# Patient Record
Sex: MALE | Race: WHITE | NOT HISPANIC OR LATINO | Employment: UNEMPLOYED | URBAN - METROPOLITAN AREA
[De-identification: names, ages, dates, MRNs, and addresses within clinical notes are randomized per-mention and may not be internally consistent; named-entity substitution may affect disease eponyms.]

---

## 2021-04-01 ENCOUNTER — RECORDS - HEALTHEAST (OUTPATIENT)
Dept: LAB | Facility: CLINIC | Age: 61
End: 2021-04-01

## 2021-04-01 LAB
SARS-COV-2 PCR COMMENT: NORMAL
SARS-COV-2 RNA SPEC QL NAA+PROBE: NEGATIVE
SARS-COV-2 VIRUS SPECIMEN SOURCE: NORMAL

## 2023-07-27 ENCOUNTER — TRANSFERRED RECORDS (OUTPATIENT)
Dept: HEALTH INFORMATION MANAGEMENT | Facility: CLINIC | Age: 63
End: 2023-07-27

## 2023-07-27 ENCOUNTER — APPOINTMENT (OUTPATIENT)
Dept: GENERAL RADIOLOGY | Facility: CLINIC | Age: 63
DRG: 481 | End: 2023-07-27
Payer: COMMERCIAL

## 2023-07-27 ENCOUNTER — HOSPITAL ENCOUNTER (INPATIENT)
Facility: CLINIC | Age: 63
LOS: 4 days | Discharge: NURSING, REHAB OR LONG-TERM CARE | DRG: 481 | End: 2023-07-31
Admitting: HOSPITALIST
Payer: COMMERCIAL

## 2023-07-27 ENCOUNTER — APPOINTMENT (OUTPATIENT)
Dept: GENERAL RADIOLOGY | Facility: CLINIC | Age: 63
DRG: 481 | End: 2023-07-27
Attending: EMERGENCY MEDICINE
Payer: COMMERCIAL

## 2023-07-27 DIAGNOSIS — S72.001A CLOSED DISPLACED FRACTURE OF RIGHT FEMORAL NECK (H): ICD-10-CM

## 2023-07-27 LAB
ANION GAP SERPL CALCULATED.3IONS-SCNC: 13 MMOL/L (ref 7–15)
ATRIAL RATE - MUSE: 75 BPM
BASOPHILS # BLD AUTO: 0.1 10E3/UL (ref 0–0.2)
BASOPHILS NFR BLD AUTO: 1 %
BUN SERPL-MCNC: 20 MG/DL (ref 8–23)
CALCIUM SERPL-MCNC: 9.4 MG/DL (ref 8.8–10.2)
CHLORIDE SERPL-SCNC: 102 MMOL/L (ref 98–107)
CREAT SERPL-MCNC: 1.42 MG/DL (ref 0.67–1.17)
DEPRECATED HCO3 PLAS-SCNC: 23 MMOL/L (ref 22–29)
DIASTOLIC BLOOD PRESSURE - MUSE: NORMAL MMHG
EOSINOPHIL # BLD AUTO: 0.2 10E3/UL (ref 0–0.7)
EOSINOPHIL NFR BLD AUTO: 3 %
ERYTHROCYTE [DISTWIDTH] IN BLOOD BY AUTOMATED COUNT: 13.1 % (ref 10–15)
GFR SERPL CREATININE-BSD FRML MDRD: 57 ML/MIN/1.73M2
GLUCOSE SERPL-MCNC: 128 MG/DL (ref 70–99)
HCT VFR BLD AUTO: 40 % (ref 40–53)
HGB BLD-MCNC: 13.8 G/DL (ref 13.3–17.7)
IMM GRANULOCYTES # BLD: 0 10E3/UL
IMM GRANULOCYTES NFR BLD: 0 %
INR PPP: 0.98 (ref 0.85–1.15)
INTERPRETATION ECG - MUSE: NORMAL
LYMPHOCYTES # BLD AUTO: 1.3 10E3/UL (ref 0.8–5.3)
LYMPHOCYTES NFR BLD AUTO: 18 %
MCH RBC QN AUTO: 31.9 PG (ref 26.5–33)
MCHC RBC AUTO-ENTMCNC: 34.5 G/DL (ref 31.5–36.5)
MCV RBC AUTO: 93 FL (ref 78–100)
MONOCYTES # BLD AUTO: 0.8 10E3/UL (ref 0–1.3)
MONOCYTES NFR BLD AUTO: 12 %
NEUTROPHILS # BLD AUTO: 4.7 10E3/UL (ref 1.6–8.3)
NEUTROPHILS NFR BLD AUTO: 66 %
NRBC # BLD AUTO: 0 10E3/UL
NRBC BLD AUTO-RTO: 0 /100
NT-PROBNP SERPL-MCNC: 460 PG/ML (ref 0–900)
P AXIS - MUSE: 62 DEGREES
PLATELET # BLD AUTO: 198 10E3/UL (ref 150–450)
POTASSIUM SERPL-SCNC: 3.9 MMOL/L (ref 3.4–5.3)
PR INTERVAL - MUSE: 154 MS
QRS DURATION - MUSE: 92 MS
QT - MUSE: 398 MS
QTC - MUSE: 444 MS
R AXIS - MUSE: 43 DEGREES
RBC # BLD AUTO: 4.32 10E6/UL (ref 4.4–5.9)
SODIUM SERPL-SCNC: 138 MMOL/L (ref 136–145)
SYSTOLIC BLOOD PRESSURE - MUSE: NORMAL MMHG
T AXIS - MUSE: 76 DEGREES
VENTRICULAR RATE- MUSE: 75 BPM
WBC # BLD AUTO: 7.1 10E3/UL (ref 4–11)

## 2023-07-27 PROCEDURE — 250N000013 HC RX MED GY IP 250 OP 250 PS 637

## 2023-07-27 PROCEDURE — 99223 1ST HOSP IP/OBS HIGH 75: CPT | Performed by: HOSPITALIST

## 2023-07-27 PROCEDURE — 73562 X-RAY EXAM OF KNEE 3: CPT | Mod: RT

## 2023-07-27 PROCEDURE — 85610 PROTHROMBIN TIME: CPT | Performed by: EMERGENCY MEDICINE

## 2023-07-27 PROCEDURE — 99285 EMERGENCY DEPT VISIT HI MDM: CPT | Mod: 25

## 2023-07-27 PROCEDURE — 120N000001 HC R&B MED SURG/OB

## 2023-07-27 PROCEDURE — 73502 X-RAY EXAM HIP UNI 2-3 VIEWS: CPT

## 2023-07-27 PROCEDURE — 71045 X-RAY EXAM CHEST 1 VIEW: CPT

## 2023-07-27 PROCEDURE — 36415 COLL VENOUS BLD VENIPUNCTURE: CPT | Performed by: EMERGENCY MEDICINE

## 2023-07-27 PROCEDURE — 83880 ASSAY OF NATRIURETIC PEPTIDE: CPT | Performed by: HOSPITALIST

## 2023-07-27 PROCEDURE — 85025 COMPLETE CBC W/AUTO DIFF WBC: CPT | Performed by: EMERGENCY MEDICINE

## 2023-07-27 PROCEDURE — 80048 BASIC METABOLIC PNL TOTAL CA: CPT | Performed by: EMERGENCY MEDICINE

## 2023-07-27 PROCEDURE — 93005 ELECTROCARDIOGRAM TRACING: CPT

## 2023-07-27 RX ORDER — FUROSEMIDE 20 MG
20 TABLET ORAL DAILY
COMMUNITY

## 2023-07-27 RX ORDER — ACETAMINOPHEN 325 MG/1
650 TABLET ORAL 2 TIMES DAILY
Status: ON HOLD | COMMUNITY
End: 2023-07-30

## 2023-07-27 RX ORDER — LANOLIN ALCOHOL/MO/W.PET/CERES
100 CREAM (GRAM) TOPICAL DAILY
COMMUNITY

## 2023-07-27 RX ORDER — OXYCODONE HYDROCHLORIDE 5 MG/1
5 TABLET ORAL ONCE
Status: COMPLETED | OUTPATIENT
Start: 2023-07-27 | End: 2023-07-27

## 2023-07-27 RX ORDER — METOPROLOL SUCCINATE 50 MG/1
50 TABLET, EXTENDED RELEASE ORAL DAILY
COMMUNITY

## 2023-07-27 RX ORDER — SERTRALINE HYDROCHLORIDE 100 MG/1
100 TABLET, FILM COATED ORAL DAILY
COMMUNITY

## 2023-07-27 RX ORDER — AMLODIPINE BESYLATE 5 MG/1
5 TABLET ORAL DAILY
COMMUNITY

## 2023-07-27 RX ORDER — TRAZODONE HYDROCHLORIDE 50 MG/1
50 TABLET, FILM COATED ORAL AT BEDTIME
COMMUNITY

## 2023-07-27 RX ORDER — ASPIRIN 81 MG/1
81 TABLET, CHEWABLE ORAL DAILY
Status: ON HOLD | COMMUNITY
End: 2023-07-30

## 2023-07-27 RX ORDER — POTASSIUM CHLORIDE 1500 MG/1
40 TABLET, EXTENDED RELEASE ORAL DAILY
COMMUNITY

## 2023-07-27 RX ORDER — ATORVASTATIN CALCIUM 40 MG/1
40 TABLET, FILM COATED ORAL AT BEDTIME
COMMUNITY

## 2023-07-27 RX ADMIN — OXYCODONE HYDROCHLORIDE 5 MG: 5 TABLET ORAL at 20:37

## 2023-07-27 ASSESSMENT — ACTIVITIES OF DAILY LIVING (ADL)
ADLS_ACUITY_SCORE: 35

## 2023-07-27 NOTE — ED PROVIDER NOTES
"  History   Chief Complaint:  Fall    The history is provided by the patient and the nursing home.      Joseph Miller is a 62 year old male with a history of past ETOH abuse, dementia, CVA, CAD and Cardiomyopathy s/p CABG who presents with right hip and right knee pain. Patient reports that he fell yesterday in his room and landed on his right hip and knee. Denies head trauma or loss of consciousness. No neck pain or back pain. He expresses that the pain in his hip is worse than his knee pain. Denies chest, back, and abdominal pain. Denies dizziness and shortness of breath. He notes pain exacerbation with movement. Denies history of surgery on his hip.     Independent Historian:   The supervisor at the patients group home, Gilbert, reports that the patient had an unwitnessed fall the night of 7/25. Patient wad limping yesterday but Gilbert noticed that he could hardly get up and was not walking well, prompting him calling EMS.    Review of External Notes:   [facility] clinical summary as of today    Medications:    Amlodipine  Lipitor  Lasix  Metoprolol  Potassium chloride  Thiamine mononitrate  Trazodone  Aspirin  Omeprazole  Zoloft    Past Medical History:    Functional urinary incontinence   Dementia   Acute cerebrovascular accident   NSTEMI   Cardiomyopathy   Tobacco dependence   Alcohol abuse   Heart failure     Surgical History:  CABG    Physical Exam     Patient Vitals for the past 24 hrs:   BP Temp Temp src Pulse Resp SpO2 Height Weight   07/27/23 1800 -- -- -- -- -- 94 % -- --   07/27/23 1717 124/78 97.7  F (36.5  C) Oral 83 18 94 % 1.765 m (5' 9.5\") 84.4 kg (186 lb)     Physical Exam  General: Nontoxic appearing adult male laying on [facility].  HENT:   Ears: No hemotympanum.  Head: No raccoon eyes or batista signs.  Mouth/Throat: Oropharynx clear and moist.  Eyes: Conjunctive and EOM normal. PERRLA.  Neck: Normal ROM. No rigidity.  No midline C-spine tenderness.  CV: Regular rate and rhythm. Normal S1, " S2.   Resp: Lungs clear to auscultation bilaterally. Normal respiratory effort. No cough observed.  GI: Abdomen soft, non distended and nontender.   MSK: Patient has tenderness over the right hip.  Able to straight leg raise on the right, but much less so as compared to the left.  Pain elicited with any movement.  Unable to fully weight-bear.  Patient has mild tenderness over the right knee, but is able to flex and extend the right knee without difficulty.  Skin: Warm and dry.  No lacerations or ecchymosis.  Neuro: Awake, alert, oriented x 3.  Sensation intact right lower extremity.  Psych: Normal mood and affect.      Emergency Department Course   ECG  ECG taken at 2031, ECG read at 2039  Normal sinus rhythm  Normal ECG   Rate 75 bpm. LA interval 154 ms. QRS duration 92 ms. QT/QTc 394/444 ms. P-R-T axes 62 43 76.     Imaging:  XR Chest 1 View   Final Result   IMPRESSION: Normal size of cardiomediastinal silhouette status post median sternotomy and CABG. No focal airspace consolidation, pleural effusion or pneumothorax. No acute bony abnormality.      XR Pelvis w Hip Right 1 View   Final Result   IMPRESSION: There is an acute mildly displaced and impacted right femoral neck fracture in valgus alignment. Mild degenerative changes of the right hip.       NOTE: ABNORMAL REPORT      THE DICTATION ABOVE DESCRIBES AN ABNORMALITY FOR WHICH FOLLOW-UP IS NEEDED.       XR Knee Right 3 Views   Final Result   IMPRESSION: No acute fracture or malalignment. No significant degenerative changes or knee joint effusion. Osteopenia.         Report per radiology    Emergency Department Course & Assessments:    Interventions:  Medications   oxyCODONE (ROXICODONE) tablet 5 mg (5 mg Oral $Given 7/27/23 2037)     Assessments:  1705 I called the patient's nursing home supervisor, Gilbert, to gain information on patient's fall. Phone [phone]. 1708 I obtained history and examined the patient as noted above.   2007 I rechecked the patient.  I discussed findings and admission with the patient. All questions answered.    2011 I left a message for Gilbert, nursing home supervisor, to inform him on the patient's hip fracture and his admission to the hospital.    Independent Interpretation (X-rays, CTs, rhythm strip):  Right hip fracture noted on XR    Consultations/Discussion of Management or Tests:  ED Course as of 07/27/23 2050 Thu Jul 27, 2023 2006 Patient staffed with Dr. Mcdonnell, emergency department attending. Hospitalist paged.    2031 I consulted with Dr. Vicente, hospitalist, regarding the patient's history and presentation here in the emergency department who accepted the patient for admission.     Social Determinants of Health affecting care:   Dementia and resides in group home.    Disposition:  The patient was admitted to the hospital under the care of Dr. Vicente.     Impression & Plan      Medical Decision Making:  Joseph Miller is a 62 year old male with a history of past ETOH abuse, dementia, CVA, CAD and Cardiomyopathy s/p CABG who presented with right hip and right knee pain after a fall at his group home yesterday.  He was initially able to walk, but it has gotten progressively harder to ambulate and bear weight.  X-ray of the right hip obtained showing an acute mildly displaced and impacted right femoral neck fracture in valgus alignment.  X-ray of the right knee negative for any acute fracture.  I discussed his case with Dr. Vicente of the hospitalist service who graciously excepted him for admission.  He will need Ortho consult.  Basic labs and EKG ordered and pending since patient will need this preoperatively.  Patient denies hitting his head, hurting his neck or back during this incident.  Due to need for hospital admission, I staffed the patient with Dr. Mcdonnell.  Please see separate [facility] supervisory note.    Diagnosis:    ICD-10-CM    1. Closed displaced fracture of right femoral neck (H)  S72.001A         Scribe Disclosure:  Anais PACHECO  Juan & Danielle Rice, am serving as a scribe at 5:29 PM on 7/27/2023 to document services personally performed by Gardenia Salinas PA-C based on my observations and the provider's statements to me.     7/27/2023   Gardenia Salinas PA-C Dewing, Jennifer C, PA-C  07/28/23 0002

## 2023-07-27 NOTE — ED NOTES
Bed: FT04  Expected date:   Expected time:   Means of arrival:   Comments:  [id number]  from group home  62 M right hip pain after fall yesterday eta [id number]

## 2023-07-27 NOTE — ED TRIAGE NOTES
Patient stated that he fell yesterday, and is now having right hip pain and knee pain, he stated that he is unable to walk due to the pain.      Triage Assessment       Row Name 07/27/23 7402       Triage Assessment (Adult)    Airway WDL WDL       Respiratory WDL    Respiratory WDL WDL       Skin Circulation/Temperature WDL    Skin Circulation/Temperature WDL WDL       Cardiac WDL    Cardiac WDL WDL       Cognitive/Neuro/Behavioral WDL    Cognitive/Neuro/Behavioral WDL WDL;X

## 2023-07-28 ENCOUNTER — APPOINTMENT (OUTPATIENT)
Dept: GENERAL RADIOLOGY | Facility: CLINIC | Age: 63
DRG: 481 | End: 2023-07-28
Attending: ORTHOPAEDIC SURGERY
Payer: COMMERCIAL

## 2023-07-28 ENCOUNTER — ANESTHESIA (OUTPATIENT)
Dept: SURGERY | Facility: CLINIC | Age: 63
DRG: 481 | End: 2023-07-28
Payer: COMMERCIAL

## 2023-07-28 ENCOUNTER — ANESTHESIA EVENT (OUTPATIENT)
Dept: SURGERY | Facility: CLINIC | Age: 63
DRG: 481 | End: 2023-07-28
Payer: COMMERCIAL

## 2023-07-28 ENCOUNTER — APPOINTMENT (OUTPATIENT)
Dept: CARDIOLOGY | Facility: CLINIC | Age: 63
DRG: 481 | End: 2023-07-28
Attending: INTERNAL MEDICINE
Payer: COMMERCIAL

## 2023-07-28 LAB
ANION GAP SERPL CALCULATED.3IONS-SCNC: 12 MMOL/L (ref 7–15)
BUN SERPL-MCNC: 19.6 MG/DL (ref 8–23)
CALCIUM SERPL-MCNC: 9.6 MG/DL (ref 8.8–10.2)
CHLORIDE SERPL-SCNC: 103 MMOL/L (ref 98–107)
CREAT SERPL-MCNC: 1.41 MG/DL (ref 0.67–1.17)
DEPRECATED HCO3 PLAS-SCNC: 22 MMOL/L (ref 22–29)
ERYTHROCYTE [DISTWIDTH] IN BLOOD BY AUTOMATED COUNT: 13.2 % (ref 10–15)
GFR SERPL CREATININE-BSD FRML MDRD: 57 ML/MIN/1.73M2
GLUCOSE SERPL-MCNC: 117 MG/DL (ref 70–99)
HCT VFR BLD AUTO: 39.7 % (ref 40–53)
HGB BLD-MCNC: 13.9 G/DL (ref 13.3–17.7)
LVEF ECHO: NORMAL
MAGNESIUM SERPL-MCNC: 2.3 MG/DL (ref 1.7–2.3)
MCH RBC QN AUTO: 32.6 PG (ref 26.5–33)
MCHC RBC AUTO-ENTMCNC: 35 G/DL (ref 31.5–36.5)
MCV RBC AUTO: 93 FL (ref 78–100)
PHOSPHATE SERPL-MCNC: 3.5 MG/DL (ref 2.5–4.5)
PLATELET # BLD AUTO: 192 10E3/UL (ref 150–450)
POTASSIUM SERPL-SCNC: 4 MMOL/L (ref 3.4–5.3)
RBC # BLD AUTO: 4.27 10E6/UL (ref 4.4–5.9)
SODIUM SERPL-SCNC: 137 MMOL/L (ref 136–145)
WBC # BLD AUTO: 6.3 10E3/UL (ref 4–11)

## 2023-07-28 PROCEDURE — 0QS634Z REPOSITION RIGHT UPPER FEMUR WITH INTERNAL FIXATION DEVICE, PERCUTANEOUS APPROACH: ICD-10-PCS | Performed by: ORTHOPAEDIC SURGERY

## 2023-07-28 PROCEDURE — 120N000001 HC R&B MED SURG/OB

## 2023-07-28 PROCEDURE — 250N000025 HC SEVOFLURANE, PER MIN: Performed by: ORTHOPAEDIC SURGERY

## 2023-07-28 PROCEDURE — 370N000017 HC ANESTHESIA TECHNICAL FEE, PER MIN: Performed by: ORTHOPAEDIC SURGERY

## 2023-07-28 PROCEDURE — 999N000208 ECHOCARDIOGRAM COMPLETE

## 2023-07-28 PROCEDURE — 250N000009 HC RX 250: Performed by: ORTHOPAEDIC SURGERY

## 2023-07-28 PROCEDURE — 84100 ASSAY OF PHOSPHORUS: CPT | Performed by: HOSPITALIST

## 2023-07-28 PROCEDURE — 83735 ASSAY OF MAGNESIUM: CPT | Performed by: HOSPITALIST

## 2023-07-28 PROCEDURE — 272N000001 HC OR GENERAL SUPPLY STERILE: Performed by: ORTHOPAEDIC SURGERY

## 2023-07-28 PROCEDURE — 250N000013 HC RX MED GY IP 250 OP 250 PS 637: Performed by: INTERNAL MEDICINE

## 2023-07-28 PROCEDURE — 250N000009 HC RX 250: Performed by: STUDENT IN AN ORGANIZED HEALTH CARE EDUCATION/TRAINING PROGRAM

## 2023-07-28 PROCEDURE — 258N000003 HC RX IP 258 OP 636: Performed by: ANESTHESIOLOGY

## 2023-07-28 PROCEDURE — 258N000003 HC RX IP 258 OP 636: Performed by: NURSE ANESTHETIST, CERTIFIED REGISTERED

## 2023-07-28 PROCEDURE — 36415 COLL VENOUS BLD VENIPUNCTURE: CPT | Performed by: INTERNAL MEDICINE

## 2023-07-28 PROCEDURE — 82310 ASSAY OF CALCIUM: CPT | Performed by: INTERNAL MEDICINE

## 2023-07-28 PROCEDURE — 85027 COMPLETE CBC AUTOMATED: CPT | Performed by: HOSPITALIST

## 2023-07-28 PROCEDURE — 999N000141 HC STATISTIC PRE-PROCEDURE NURSING ASSESSMENT: Performed by: ORTHOPAEDIC SURGERY

## 2023-07-28 PROCEDURE — 710N000009 HC RECOVERY PHASE 1, LEVEL 1, PER MIN: Performed by: ORTHOPAEDIC SURGERY

## 2023-07-28 PROCEDURE — 360N000084 HC SURGERY LEVEL 4 W/ FLUORO, PER MIN: Performed by: ORTHOPAEDIC SURGERY

## 2023-07-28 PROCEDURE — 250N000009 HC RX 250: Performed by: NURSE ANESTHETIST, CERTIFIED REGISTERED

## 2023-07-28 PROCEDURE — 250N000013 HC RX MED GY IP 250 OP 250 PS 637: Performed by: HOSPITALIST

## 2023-07-28 PROCEDURE — 999N000179 XR SURGERY CARM FLUORO LESS THAN 5 MIN W STILLS

## 2023-07-28 PROCEDURE — C1713 ANCHOR/SCREW BN/BN,TIS/BN: HCPCS | Performed by: ORTHOPAEDIC SURGERY

## 2023-07-28 PROCEDURE — 250N000013 HC RX MED GY IP 250 OP 250 PS 637: Performed by: PHYSICIAN ASSISTANT

## 2023-07-28 PROCEDURE — 99233 SBSQ HOSP IP/OBS HIGH 50: CPT | Performed by: INTERNAL MEDICINE

## 2023-07-28 PROCEDURE — 93306 TTE W/DOPPLER COMPLETE: CPT | Mod: 26 | Performed by: INTERNAL MEDICINE

## 2023-07-28 PROCEDURE — 250N000011 HC RX IP 250 OP 636: Mod: JZ | Performed by: NURSE ANESTHETIST, CERTIFIED REGISTERED

## 2023-07-28 PROCEDURE — 250N000011 HC RX IP 250 OP 636: Performed by: STUDENT IN AN ORGANIZED HEALTH CARE EDUCATION/TRAINING PROGRAM

## 2023-07-28 PROCEDURE — 250N000009 HC RX 250: Performed by: ANESTHESIOLOGY

## 2023-07-28 PROCEDURE — 250N000011 HC RX IP 250 OP 636: Mod: JZ | Performed by: PHYSICIAN ASSISTANT

## 2023-07-28 PROCEDURE — 250N000011 HC RX IP 250 OP 636: Performed by: ANESTHESIOLOGY

## 2023-07-28 DEVICE — IMP SCR SYN CAN 6.5X16 THRDX95MM SS 208.414: Type: IMPLANTABLE DEVICE | Site: HIP | Status: FUNCTIONAL

## 2023-07-28 DEVICE — IMP SCR SYN CAN 6.5X16 THRDX85MM SS 208.412: Type: IMPLANTABLE DEVICE | Site: HIP | Status: FUNCTIONAL

## 2023-07-28 DEVICE — IMP SCR SYN CAN 6.5X16 THRDX100MM SS 208.415: Type: IMPLANTABLE DEVICE | Site: HIP | Status: FUNCTIONAL

## 2023-07-28 RX ORDER — FENTANYL CITRATE 50 UG/ML
INJECTION, SOLUTION INTRAMUSCULAR; INTRAVENOUS PRN
Status: DISCONTINUED | OUTPATIENT
Start: 2023-07-28 | End: 2023-07-28

## 2023-07-28 RX ORDER — HYDROMORPHONE HCL IN WATER/PF 6 MG/30 ML
0.4 PATIENT CONTROLLED ANALGESIA SYRINGE INTRAVENOUS EVERY 5 MIN PRN
Status: DISCONTINUED | OUTPATIENT
Start: 2023-07-28 | End: 2023-07-28 | Stop reason: HOSPADM

## 2023-07-28 RX ORDER — ONDANSETRON 2 MG/ML
INJECTION INTRAMUSCULAR; INTRAVENOUS PRN
Status: DISCONTINUED | OUTPATIENT
Start: 2023-07-28 | End: 2023-07-28

## 2023-07-28 RX ORDER — AMOXICILLIN 250 MG
2 CAPSULE ORAL 2 TIMES DAILY
Status: DISCONTINUED | OUTPATIENT
Start: 2023-07-28 | End: 2023-07-31 | Stop reason: HOSPADM

## 2023-07-28 RX ORDER — DEXMEDETOMIDINE HYDROCHLORIDE 4 UG/ML
INJECTION, SOLUTION INTRAVENOUS PRN
Status: DISCONTINUED | OUTPATIENT
Start: 2023-07-28 | End: 2023-07-28

## 2023-07-28 RX ORDER — SODIUM CHLORIDE, SODIUM LACTATE, POTASSIUM CHLORIDE, CALCIUM CHLORIDE 600; 310; 30; 20 MG/100ML; MG/100ML; MG/100ML; MG/100ML
INJECTION, SOLUTION INTRAVENOUS CONTINUOUS
Status: DISCONTINUED | OUTPATIENT
Start: 2023-07-28 | End: 2023-07-28 | Stop reason: HOSPADM

## 2023-07-28 RX ORDER — ASPIRIN 325 MG
325 TABLET, DELAYED RELEASE (ENTERIC COATED) ORAL DAILY
Status: DISCONTINUED | OUTPATIENT
Start: 2023-07-28 | End: 2023-07-31 | Stop reason: HOSPADM

## 2023-07-28 RX ORDER — NALOXONE HYDROCHLORIDE 0.4 MG/ML
0.2 INJECTION, SOLUTION INTRAMUSCULAR; INTRAVENOUS; SUBCUTANEOUS
Status: DISCONTINUED | OUTPATIENT
Start: 2023-07-28 | End: 2023-07-31 | Stop reason: HOSPADM

## 2023-07-28 RX ORDER — DEXAMETHASONE SODIUM PHOSPHATE 4 MG/ML
INJECTION, SOLUTION INTRA-ARTICULAR; INTRALESIONAL; INTRAMUSCULAR; INTRAVENOUS; SOFT TISSUE PRN
Status: DISCONTINUED | OUTPATIENT
Start: 2023-07-28 | End: 2023-07-28

## 2023-07-28 RX ORDER — HYDROXYZINE HYDROCHLORIDE 25 MG/1
25 TABLET, FILM COATED ORAL EVERY 6 HOURS PRN
Status: DISCONTINUED | OUTPATIENT
Start: 2023-07-28 | End: 2023-07-31 | Stop reason: HOSPADM

## 2023-07-28 RX ORDER — ONDANSETRON 4 MG/1
4 TABLET, ORALLY DISINTEGRATING ORAL EVERY 6 HOURS PRN
Status: DISCONTINUED | OUTPATIENT
Start: 2023-07-28 | End: 2023-07-28

## 2023-07-28 RX ORDER — ONDANSETRON 2 MG/ML
4 INJECTION INTRAMUSCULAR; INTRAVENOUS EVERY 6 HOURS PRN
Status: DISCONTINUED | OUTPATIENT
Start: 2023-07-28 | End: 2023-07-31 | Stop reason: HOSPADM

## 2023-07-28 RX ORDER — FENTANYL CITRATE 0.05 MG/ML
50 INJECTION, SOLUTION INTRAMUSCULAR; INTRAVENOUS
Status: DISCONTINUED | OUTPATIENT
Start: 2023-07-28 | End: 2023-07-28 | Stop reason: HOSPADM

## 2023-07-28 RX ORDER — NALOXONE HYDROCHLORIDE 0.4 MG/ML
0.4 INJECTION, SOLUTION INTRAMUSCULAR; INTRAVENOUS; SUBCUTANEOUS
Status: DISCONTINUED | OUTPATIENT
Start: 2023-07-28 | End: 2023-07-31 | Stop reason: HOSPADM

## 2023-07-28 RX ORDER — OXYCODONE HYDROCHLORIDE 5 MG/1
5 TABLET ORAL EVERY 4 HOURS PRN
Status: DISCONTINUED | OUTPATIENT
Start: 2023-07-28 | End: 2023-07-31 | Stop reason: HOSPADM

## 2023-07-28 RX ORDER — METOPROLOL SUCCINATE 50 MG/1
50 TABLET, EXTENDED RELEASE ORAL DAILY
Status: DISCONTINUED | OUTPATIENT
Start: 2023-07-28 | End: 2023-07-31 | Stop reason: HOSPADM

## 2023-07-28 RX ORDER — ATORVASTATIN CALCIUM 40 MG/1
40 TABLET, FILM COATED ORAL DAILY
Status: DISCONTINUED | OUTPATIENT
Start: 2023-07-28 | End: 2023-07-31 | Stop reason: HOSPADM

## 2023-07-28 RX ORDER — TRAZODONE HYDROCHLORIDE 50 MG/1
50 TABLET, FILM COATED ORAL AT BEDTIME
Status: DISCONTINUED | OUTPATIENT
Start: 2023-07-28 | End: 2023-07-31 | Stop reason: HOSPADM

## 2023-07-28 RX ORDER — POLYETHYLENE GLYCOL 3350 17 G/17G
17 POWDER, FOR SOLUTION ORAL DAILY PRN
Status: DISCONTINUED | OUTPATIENT
Start: 2023-07-28 | End: 2023-07-31 | Stop reason: HOSPADM

## 2023-07-28 RX ORDER — PANTOPRAZOLE SODIUM 40 MG/1
40 TABLET, DELAYED RELEASE ORAL DAILY
Status: DISCONTINUED | OUTPATIENT
Start: 2023-07-28 | End: 2023-07-31 | Stop reason: HOSPADM

## 2023-07-28 RX ORDER — ONDANSETRON 2 MG/ML
4 INJECTION INTRAMUSCULAR; INTRAVENOUS EVERY 6 HOURS PRN
Status: DISCONTINUED | OUTPATIENT
Start: 2023-07-28 | End: 2023-07-28

## 2023-07-28 RX ORDER — POLYETHYLENE GLYCOL 3350 17 G/17G
17 POWDER, FOR SOLUTION ORAL DAILY
Status: DISCONTINUED | OUTPATIENT
Start: 2023-07-29 | End: 2023-07-31 | Stop reason: HOSPADM

## 2023-07-28 RX ORDER — AMOXICILLIN 250 MG
1 CAPSULE ORAL 2 TIMES DAILY
Status: DISCONTINUED | OUTPATIENT
Start: 2023-07-28 | End: 2023-07-28

## 2023-07-28 RX ORDER — ACETAMINOPHEN 325 MG/1
975 TABLET ORAL EVERY 8 HOURS
Status: DISCONTINUED | OUTPATIENT
Start: 2023-07-28 | End: 2023-07-31 | Stop reason: HOSPADM

## 2023-07-28 RX ORDER — MAGNESIUM HYDROXIDE 1200 MG/15ML
LIQUID ORAL PRN
Status: DISCONTINUED | OUTPATIENT
Start: 2023-07-28 | End: 2023-07-28 | Stop reason: HOSPADM

## 2023-07-28 RX ORDER — TRANEXAMIC ACID 10 MG/ML
1 INJECTION, SOLUTION INTRAVENOUS ONCE
Status: COMPLETED | OUTPATIENT
Start: 2023-07-28 | End: 2023-07-28

## 2023-07-28 RX ORDER — METHOCARBAMOL 500 MG/1
500 TABLET, FILM COATED ORAL 4 TIMES DAILY PRN
Status: DISCONTINUED | OUTPATIENT
Start: 2023-07-28 | End: 2023-07-31 | Stop reason: HOSPADM

## 2023-07-28 RX ORDER — FENTANYL CITRATE 50 UG/ML
50 INJECTION, SOLUTION INTRAMUSCULAR; INTRAVENOUS EVERY 5 MIN PRN
Status: DISCONTINUED | OUTPATIENT
Start: 2023-07-28 | End: 2023-07-28 | Stop reason: HOSPADM

## 2023-07-28 RX ORDER — BISACODYL 10 MG
10 SUPPOSITORY, RECTAL RECTAL DAILY PRN
Status: DISCONTINUED | OUTPATIENT
Start: 2023-07-28 | End: 2023-07-31 | Stop reason: HOSPADM

## 2023-07-28 RX ORDER — CEFAZOLIN SODIUM/WATER 2 G/20 ML
2 SYRINGE (ML) INTRAVENOUS SEE ADMIN INSTRUCTIONS
Status: DISCONTINUED | OUTPATIENT
Start: 2023-07-28 | End: 2023-07-28 | Stop reason: HOSPADM

## 2023-07-28 RX ORDER — LIDOCAINE HYDROCHLORIDE 20 MG/ML
INJECTION, SOLUTION INFILTRATION; PERINEURAL PRN
Status: DISCONTINUED | OUTPATIENT
Start: 2023-07-28 | End: 2023-07-28

## 2023-07-28 RX ORDER — CEFAZOLIN SODIUM/WATER 2 G/20 ML
2 SYRINGE (ML) INTRAVENOUS
Status: COMPLETED | OUTPATIENT
Start: 2023-07-28 | End: 2023-07-28

## 2023-07-28 RX ORDER — LIDOCAINE 40 MG/G
CREAM TOPICAL
Status: DISCONTINUED | OUTPATIENT
Start: 2023-07-28 | End: 2023-07-28 | Stop reason: HOSPADM

## 2023-07-28 RX ORDER — HYDROMORPHONE HCL IN WATER/PF 6 MG/30 ML
0.2 PATIENT CONTROLLED ANALGESIA SYRINGE INTRAVENOUS EVERY 5 MIN PRN
Status: DISCONTINUED | OUTPATIENT
Start: 2023-07-28 | End: 2023-07-28 | Stop reason: HOSPADM

## 2023-07-28 RX ORDER — CEFAZOLIN SODIUM 2 G/100ML
2 INJECTION, SOLUTION INTRAVENOUS EVERY 8 HOURS
Status: COMPLETED | OUTPATIENT
Start: 2023-07-28 | End: 2023-07-29

## 2023-07-28 RX ORDER — PROCHLORPERAZINE 25 MG
25 SUPPOSITORY, RECTAL RECTAL EVERY 12 HOURS PRN
Status: DISCONTINUED | OUTPATIENT
Start: 2023-07-28 | End: 2023-07-28

## 2023-07-28 RX ORDER — LIDOCAINE 40 MG/G
CREAM TOPICAL
Status: DISCONTINUED | OUTPATIENT
Start: 2023-07-28 | End: 2023-07-31 | Stop reason: HOSPADM

## 2023-07-28 RX ORDER — PROPOFOL 10 MG/ML
INJECTION, EMULSION INTRAVENOUS PRN
Status: DISCONTINUED | OUTPATIENT
Start: 2023-07-28 | End: 2023-07-28

## 2023-07-28 RX ORDER — HYDROMORPHONE HCL IN WATER/PF 6 MG/30 ML
0.2 PATIENT CONTROLLED ANALGESIA SYRINGE INTRAVENOUS
Status: DISCONTINUED | OUTPATIENT
Start: 2023-07-28 | End: 2023-07-31 | Stop reason: HOSPADM

## 2023-07-28 RX ORDER — ONDANSETRON 4 MG/1
4 TABLET, ORALLY DISINTEGRATING ORAL EVERY 6 HOURS PRN
Status: DISCONTINUED | OUTPATIENT
Start: 2023-07-28 | End: 2023-07-31 | Stop reason: HOSPADM

## 2023-07-28 RX ORDER — HYDROMORPHONE HCL IN WATER/PF 6 MG/30 ML
0.4 PATIENT CONTROLLED ANALGESIA SYRINGE INTRAVENOUS
Status: DISCONTINUED | OUTPATIENT
Start: 2023-07-28 | End: 2023-07-31 | Stop reason: HOSPADM

## 2023-07-28 RX ORDER — SERTRALINE HYDROCHLORIDE 100 MG/1
100 TABLET, FILM COATED ORAL DAILY
Status: DISCONTINUED | OUTPATIENT
Start: 2023-07-28 | End: 2023-07-31 | Stop reason: HOSPADM

## 2023-07-28 RX ORDER — SODIUM CHLORIDE, SODIUM LACTATE, POTASSIUM CHLORIDE, CALCIUM CHLORIDE 600; 310; 30; 20 MG/100ML; MG/100ML; MG/100ML; MG/100ML
INJECTION, SOLUTION INTRAVENOUS CONTINUOUS
Status: DISCONTINUED | OUTPATIENT
Start: 2023-07-28 | End: 2023-07-30

## 2023-07-28 RX ORDER — AMOXICILLIN 250 MG
1 CAPSULE ORAL 2 TIMES DAILY
Status: DISCONTINUED | OUTPATIENT
Start: 2023-07-28 | End: 2023-07-31 | Stop reason: HOSPADM

## 2023-07-28 RX ORDER — AMLODIPINE BESYLATE 5 MG/1
5 TABLET ORAL DAILY
Status: DISCONTINUED | OUTPATIENT
Start: 2023-07-28 | End: 2023-07-31 | Stop reason: HOSPADM

## 2023-07-28 RX ORDER — PROCHLORPERAZINE MALEATE 10 MG
10 TABLET ORAL EVERY 6 HOURS PRN
Status: DISCONTINUED | OUTPATIENT
Start: 2023-07-28 | End: 2023-07-31 | Stop reason: HOSPADM

## 2023-07-28 RX ORDER — ONDANSETRON 2 MG/ML
4 INJECTION INTRAMUSCULAR; INTRAVENOUS EVERY 30 MIN PRN
Status: DISCONTINUED | OUTPATIENT
Start: 2023-07-28 | End: 2023-07-28 | Stop reason: HOSPADM

## 2023-07-28 RX ORDER — NICOTINE 21 MG/24HR
1 PATCH, TRANSDERMAL 24 HOURS TRANSDERMAL DAILY
Status: DISCONTINUED | OUTPATIENT
Start: 2023-07-28 | End: 2023-07-31 | Stop reason: HOSPADM

## 2023-07-28 RX ORDER — ONDANSETRON 4 MG/1
4 TABLET, ORALLY DISINTEGRATING ORAL EVERY 30 MIN PRN
Status: DISCONTINUED | OUTPATIENT
Start: 2023-07-28 | End: 2023-07-28 | Stop reason: HOSPADM

## 2023-07-28 RX ORDER — FENTANYL CITRATE 50 UG/ML
25 INJECTION, SOLUTION INTRAMUSCULAR; INTRAVENOUS EVERY 5 MIN PRN
Status: DISCONTINUED | OUTPATIENT
Start: 2023-07-28 | End: 2023-07-28 | Stop reason: HOSPADM

## 2023-07-28 RX ORDER — PROCHLORPERAZINE MALEATE 10 MG
10 TABLET ORAL EVERY 6 HOURS PRN
Status: DISCONTINUED | OUTPATIENT
Start: 2023-07-28 | End: 2023-07-28

## 2023-07-28 RX ADMIN — SODIUM CHLORIDE, POTASSIUM CHLORIDE, SODIUM LACTATE AND CALCIUM CHLORIDE: 600; 310; 30; 20 INJECTION, SOLUTION INTRAVENOUS at 11:46

## 2023-07-28 RX ADMIN — ACETAMINOPHEN 975 MG: 325 TABLET, FILM COATED ORAL at 16:08

## 2023-07-28 RX ADMIN — MIDAZOLAM 2 MG: 1 INJECTION INTRAMUSCULAR; INTRAVENOUS at 11:53

## 2023-07-28 RX ADMIN — SENNOSIDES AND DOCUSATE SODIUM 1 TABLET: 50; 8.6 TABLET ORAL at 08:51

## 2023-07-28 RX ADMIN — OXYCODONE HYDROCHLORIDE 5 MG: 5 TABLET ORAL at 08:51

## 2023-07-28 RX ADMIN — PANTOPRAZOLE SODIUM 40 MG: 40 TABLET, DELAYED RELEASE ORAL at 08:49

## 2023-07-28 RX ADMIN — ATORVASTATIN CALCIUM 40 MG: 40 TABLET, FILM COATED ORAL at 08:49

## 2023-07-28 RX ADMIN — SERTRALINE HYDROCHLORIDE 100 MG: 100 TABLET ORAL at 08:50

## 2023-07-28 RX ADMIN — LIDOCAINE HYDROCHLORIDE 100 MG: 20 INJECTION, SOLUTION INFILTRATION; PERINEURAL at 12:18

## 2023-07-28 RX ADMIN — ACETAMINOPHEN 975 MG: 325 TABLET, FILM COATED ORAL at 01:18

## 2023-07-28 RX ADMIN — METOPROLOL SUCCINATE 50 MG: 50 TABLET, EXTENDED RELEASE ORAL at 08:50

## 2023-07-28 RX ADMIN — ACETAMINOPHEN 975 MG: 325 TABLET, FILM COATED ORAL at 08:49

## 2023-07-28 RX ADMIN — PHENYLEPHRINE HYDROCHLORIDE 0.5 MCG/KG/MIN: 10 INJECTION INTRAVENOUS at 12:33

## 2023-07-28 RX ADMIN — DEXMEDETOMIDINE HYDROCHLORIDE 4 MCG: 200 INJECTION INTRAVENOUS at 13:02

## 2023-07-28 RX ADMIN — CEFAZOLIN SODIUM 2 G: 2 INJECTION, SOLUTION INTRAVENOUS at 20:06

## 2023-07-28 RX ADMIN — TRAZODONE HYDROCHLORIDE 50 MG: 50 TABLET ORAL at 21:11

## 2023-07-28 RX ADMIN — AMLODIPINE BESYLATE 5 MG: 5 TABLET ORAL at 08:51

## 2023-07-28 RX ADMIN — SENNOSIDES AND DOCUSATE SODIUM 1 TABLET: 50; 8.6 TABLET ORAL at 01:17

## 2023-07-28 RX ADMIN — FENTANYL CITRATE 100 MCG: 50 INJECTION, SOLUTION INTRAMUSCULAR; INTRAVENOUS at 12:18

## 2023-07-28 RX ADMIN — Medication 2 G: at 12:10

## 2023-07-28 RX ADMIN — OXYCODONE HYDROCHLORIDE 5 MG: 5 TABLET ORAL at 01:21

## 2023-07-28 RX ADMIN — TRANEXAMIC ACID 1 G: 10 INJECTION, SOLUTION INTRAVENOUS at 12:50

## 2023-07-28 RX ADMIN — ROCURONIUM BROMIDE 50 MG: 50 INJECTION, SOLUTION INTRAVENOUS at 12:18

## 2023-07-28 RX ADMIN — ASPIRIN 325 MG: 325 TABLET, COATED ORAL at 14:50

## 2023-07-28 RX ADMIN — TRAZODONE HYDROCHLORIDE 50 MG: 50 TABLET ORAL at 01:17

## 2023-07-28 RX ADMIN — SERTRALINE HYDROCHLORIDE 50 MG: 50 TABLET ORAL at 08:50

## 2023-07-28 RX ADMIN — NICOTINE 1 PATCH: 21 PATCH, EXTENDED RELEASE TRANSDERMAL at 11:23

## 2023-07-28 RX ADMIN — Medication 20 ML: at 12:00

## 2023-07-28 RX ADMIN — ONDANSETRON 4 MG: 2 INJECTION INTRAMUSCULAR; INTRAVENOUS at 12:48

## 2023-07-28 RX ADMIN — PHENYLEPHRINE HYDROCHLORIDE 100 MCG: 10 INJECTION INTRAVENOUS at 12:35

## 2023-07-28 RX ADMIN — SENNOSIDES AND DOCUSATE SODIUM 1 TABLET: 50; 8.6 TABLET ORAL at 20:06

## 2023-07-28 RX ADMIN — PROPOFOL 160 MG: 10 INJECTION, EMULSION INTRAVENOUS at 12:18

## 2023-07-28 RX ADMIN — DEXAMETHASONE SODIUM PHOSPHATE 10 MG: 4 INJECTION, SOLUTION INTRA-ARTICULAR; INTRALESIONAL; INTRAMUSCULAR; INTRAVENOUS; SOFT TISSUE at 12:18

## 2023-07-28 RX ADMIN — SUGAMMADEX 200 MG: 100 INJECTION, SOLUTION INTRAVENOUS at 12:59

## 2023-07-28 RX ADMIN — TRANEXAMIC ACID 1 G: 10 INJECTION, SOLUTION INTRAVENOUS at 12:36

## 2023-07-28 ASSESSMENT — ACTIVITIES OF DAILY LIVING (ADL)
ADLS_ACUITY_SCORE: 32
WALKING_OR_CLIMBING_STAIRS_DIFFICULTY: NO
ADLS_ACUITY_SCORE: 24
DIFFICULTY_EATING/SWALLOWING: NO
ADLS_ACUITY_SCORE: 32
NUMBER_OF_TIMES_PATIENT_HAS_FALLEN_WITHIN_LAST_SIX_MONTHS: 3
FALL_HISTORY_WITHIN_LAST_SIX_MONTHS: YES
WEAR_GLASSES_OR_BLIND: NO
DOING_ERRANDS_INDEPENDENTLY_DIFFICULTY: YES
TOILETING_ISSUES: NO
ADLS_ACUITY_SCORE: 34
CHANGE_IN_FUNCTIONAL_STATUS_SINCE_ONSET_OF_CURRENT_ILLNESS/INJURY: YES
ADLS_ACUITY_SCORE: 32
ADLS_ACUITY_SCORE: 32
ADLS_ACUITY_SCORE: 31
ADLS_ACUITY_SCORE: 24
ADLS_ACUITY_SCORE: 24
ADLS_ACUITY_SCORE: 38
CONCENTRATING,_REMEMBERING_OR_MAKING_DECISIONS_DIFFICULTY: NO
ADLS_ACUITY_SCORE: 38
DRESSING/BATHING_DIFFICULTY: NO
ADLS_ACUITY_SCORE: 24

## 2023-07-28 ASSESSMENT — LIFESTYLE VARIABLES: TOBACCO_USE: 1

## 2023-07-28 NOTE — H&P
"[facility]    History and Physical - Hospitalist Service       Date of Admission:  7/27/2023    Assessment & Plan      Joseph Miller is a 62 year old male admitted on 7/27/2023 with right hip pain found to have right femur fracture    Closed displaced fracture of right femoral neck  Mechanical Fall  Fell in his room landing on right hip and knee on 7/26. Pain since, but had been walking on leg.  *XR right knee: no acute fracture or malalignment  *XR pelvis: acute mildly displaced and impacted right femoral neck fracture in valgus alignment.  - orthopedic surgery consult  - PRN oxycodone, IV dilaudid  - scheduled tylenol  - bedrest  - NPO at midnight  - await result of echocardiogram prior to OR    CAD s/p CABG  Cardiomyopathy   Hypertension  Last EF 31% with global hypokinesis on cardiac MRI in march 2021  BNP is 460. EKG is NSR. CXR is clear without infiltrates  *PTA meds include amlodipine 5mg daily, ASA 81mg daily, lipitor 40mg, toprol 50mg daily and furosemide 20mg daily  - ensure echocardiogram done prior to OR  - hold PTA ASA and lasix for now  - resume PTA amlodipine, toprol with hold parameters  - resume statin    GERD  Dysphagia  - had video swallow on 7/18 with normal results  - PPI continued    CKD Stage 3  Recent baseline has been 1.3-1.4  - Cr on admit is 1.42    Hx CVA  Dementia  Lives in group home  - continue PTA zoloft and trazodone     Diet:  NPO at midnight  DVT Prophylaxis: Pneumatic Compression Devices  Sharpe Catheter: Not present  Lines: None     Cardiac Monitoring: None  Code Status:  full code    Clinically Significant Risk Factors Present on Admission                # Drug Induced Platelet Defect: home medication list includes an antiplatelet medication        # Overweight: Estimated body mass index is 27.07 kg/m  as calculated from the following:    Height as of this encounter: 1.765 m (5' 9.5\").    Weight as of this encounter: 84.4 kg (186 lb).            Disposition " Plan      Expected Discharge Date: 07/29/2023                  Shweta Vicente DO  Hospitalist Service  [facility]  Securely message with [employer] (more info)  Text page via [employer] Paging/Directory     ______________________________________________________________________    Chief Complaint   Right hip pain    History is obtained from the patient, prior record review    History of Present Illness   Joseph Miller is a 62 year old male who fell on 7/26 in his room (unwitnessed), landing on his right hip and knee. He did not hit his head. He continued to walk afterwards around the group home but had some limping. Then on 7/27 he had more trouble walking, not getting up well and was sent to the ED for evaluation. He had right hip tenderness, imaging confirmed fracture. He reports intermittent sharp pains in his leg. Denies pain elsewhere.     Past Medical History    Functional urinary incontinence   Dementia   Acute cerebrovascular accident   NSTEMI   Cardiomyopathy   Tobacco dependence   Alcohol abuse   Heart failure     Past Surgical History   Past Surgical History:   Procedure Laterality Date    IR CAROTID CEREBRAL ANGIOGRAM BILATERAL  3/18/2021   CABG    Prior to Admission Medications      Amlodipine  Lipitor  Lasix  Metoprolol  Potassium chloride  Thiamine mononitrate  Trazodone  Aspirin  Omeprazole  Zoloft     Physical Exam   Vital Signs: Temp: 97.7  F (36.5  C) Temp src: Oral BP: 124/78 Pulse: 83   Resp: 18 SpO2: 94 % O2 Device: None (Room air)    Weight: 186 lbs 0 oz    Constitutional: Awake, alert, cooperative, no apparent distress  Respiratory: Clear to auscultation bilaterally, no crackles or wheezing  Cardiovascular: Regular rate and rhythm, normal S1 and S2, and no murmur noted  GI: Normal bowel sounds, soft, non-distended, non-tender  Skin/Integumen: No rashes, no cyanosis, no edema  Other:  Tenderness to light palpation of right hip and mid femur    Medical Decision Making       75  MINUTES SPENT BY ME on the date of service doing chart review, history, exam, documentation & further activities per the note.      Data     I have personally reviewed the following data over the past 24 hrs:    7.1  \   13.8   / 198     138 102 20.0 /  128 (H)   3.9 23 1.42 (H) \     Trop: N/A BNP: 460     INR:  0.98 PTT:  N/A   D-dimer:  N/A Fibrinogen:  N/A     Imaging results reviewed over the past 24 hrs:   Recent Results (from the past 24 hour(s))   XR Knee Right 3 Views    Narrative    EXAM: XR KNEE RIGHT 3 VIEWS  LOCATION: [facility]  DATE: 7/27/2023    INDICATION: righ knee discomfort after a fall  COMPARISON: None.      Impression    IMPRESSION: No acute fracture or malalignment. No significant degenerative changes or knee joint effusion. Osteopenia.   XR Pelvis w Hip Right 1 View    Narrative    EXAM: XR PELVIS AND HIP RIGHT 1 VIEW  LOCATION: [facility]  DATE: 7/27/2023    INDICATION: right hip pain after a fall, hard time weight bearing walking  COMPARISON: None.      Impression    IMPRESSION: There is an acute mildly displaced and impacted right femoral neck fracture in valgus alignment. Mild degenerative changes of the right hip.     NOTE: ABNORMAL REPORT    THE DICTATION ABOVE DESCRIBES AN ABNORMALITY FOR WHICH FOLLOW-UP IS NEEDED.    XR Chest 1 View    Narrative    EXAM: XR CHEST 1 VIEW  LOCATION: [facility]  DATE: 7/27/2023    INDICATION: pre op  COMPARISON: None available      Impression    IMPRESSION: Normal size of cardiomediastinal silhouette status post median sternotomy and CABG. No focal airspace consolidation, pleural effusion or pneumothorax. No acute bony abnormality.

## 2023-07-28 NOTE — PROGRESS NOTES
Date/Time: 7/28: 7a-7p  Summary: Closed displaced fracture of right femoral neck d/t mechanical fall.  POD#0: CLOSED REDUCTION, RIGHT HIP, WITH PERCUTANEOUS PINNING  Mental Status: A/Ox4. Tired.   Activity/dangle:PT will evaluate pt tomorrow morning.   Diet: Reg  Pain: ScheTylenol and PRN Oxycodone  Sharpe/Voiding: Incontinent before surgery, now due to void. Bladder scan 381ml at 1917hrs.   Tele/Restraints/Iso:Tele: SR  Skin: R femur surgical site w/ drsg, CDI.   02/LDA: VSS on o2 4L nc on capno. IVF infusing as ordered, can saline lock.   D/C Date: TBD, Pending clinical improvement.

## 2023-07-28 NOTE — PROGRESS NOTES
"Rosio Osuna called, this writer picked up the phone.  Asked pt Joseph if it was OK to give information to Rosio and pt stated \"Yes, you can give her any information she wants.\"  Talked with Rosio in regards to the pt.  Rosio explained that the pt \"can be a real Jerk.  Pt has a very dry sense of humor and has a history of make inappropriate comments to women, I think the nurses should know this.  His comments can be taken the wrong way.  I know he wants me to bring him cigarettes, he does get them at the group home, usually 10/day, his sister wants him to be limited on this.  You may want to address this with the Dr's.\"  Will notify the bedside RN of this conversation.   "

## 2023-07-28 NOTE — ANESTHESIA PREPROCEDURE EVALUATION
Anesthesia Pre-Procedure Evaluation    Patient: Joseph Miller   MRN: [id number] : 1960        Procedure : Procedure(s):  CLOSED REDUCTION, RIGHT HIP, WITH PERCUTANEOUS PINNING          No past medical history on file.   Past Surgical History:   Procedure Laterality Date    IR CAROTID CEREBRAL ANGIOGRAM BILATERAL  3/18/2021      No Known Allergies   Social History     Tobacco Use    Smoking status: Not on file    Smokeless tobacco: Not on file   Substance Use Topics    Alcohol use: Not on file      Wt Readings from Last 1 Encounters:   23 81 kg (178 lb 9.2 oz)        Anesthesia Evaluation            ROS/MED HX  ENT/Pulmonary:     (+)                tobacco use,                    (-) sleep apnea   Neurologic:     (+)   dementia,       CVA,                      Cardiovascular:     (+)  - -  CAD -  CABG- -      CHF                           Previous cardiac testing   Echo: Date: 23 Results:  Interpretation Summary     The left ventricle is normal in size.  The visual ejection fraction is 50-55%.  Diastolic Doppler findings (E/E' ratio and/or other parameters) suggest left  ventricular filling pressures are normal.  There is mild to moderate apical wall hypokinesis.  No significant valvular heart disease.  ______________________________________________________________________________  Left Ventricle  The left ventricle is normal in size. There is normal left ventricular wall  thickness. Diastolic Doppler findings (E/E' ratio and/or other parameters)  suggest left ventricular filling pressures are normal. The visual ejection  fraction is 50-55%. There is mild to moderate apical wall hypokinesis.     Right Ventricle  The right ventricle is normal in size and function.     Atria  Normal left atrial size. Right atrial size is normal. There is no color  Doppler evidence of an atrial shunt.     Mitral Valve  The mitral valve leaflets are mildly thickened. There is trace mitral  regurgitation.     Tricuspid  Valve  There is trace tricuspid regurgitation.     Aortic Valve  There is trivial trileaflet aortic sclerosis. No aortic regurgitation is  present.     Pulmonic Valve  There is trace pulmonic valvular regurgitation.     Vessels  Normal size aorta. Borderline aortic root dilatation.     Pericardium  There is no pericardial effusion.     Rhythm  Sinus rhythm was noted.    Stress Test:  Date: Results:    ECG Reviewed:  Date: Results:    Cath:  Date: Results:      METS/Exercise Tolerance:     Hematologic:       Musculoskeletal:   (+)     fracture, Fracture location: RLE,         GI/Hepatic:    (-) GERD   Renal/Genitourinary:       Endo:       Psychiatric/Substance Use:     (+)   alcohol abuse      Infectious Disease:       Malignancy:       Other:            Physical Exam    Airway        Mallampati: II   TM distance: > 3 FB   Neck ROM: full   Mouth opening: > 3 cm    Respiratory Devices and Support         Dental       (+) Minor Abnormalities - some fillings, tiny chips      Cardiovascular   cardiovascular exam normal          Pulmonary   pulmonary exam normal                OUTSIDE LABS:  CBC:   Lab Results   Component Value Date    WBC 6.3 07/28/2023    WBC 7.1 07/27/2023    HGB 13.9 07/28/2023    HGB 13.8 07/27/2023    HCT 39.7 (L) 07/28/2023    HCT 40.0 07/27/2023     07/28/2023     07/27/2023     BMP:   Lab Results   Component Value Date     07/28/2023     07/27/2023    POTASSIUM 4.0 07/28/2023    POTASSIUM 3.9 07/27/2023    CHLORIDE 103 07/28/2023    CHLORIDE 102 07/27/2023    CO2 22 07/28/2023    CO2 23 07/27/2023    BUN 19.6 07/28/2023    BUN 20.0 07/27/2023    CR 1.41 (H) 07/28/2023    CR 1.42 (H) 07/27/2023     (H) 07/28/2023     (H) 07/27/2023     COAGS:   Lab Results   Component Value Date    INR 0.98 07/27/2023     POC: No results found for: BGM, HCG, HCGS  HEPATIC: No results found for: ALBUMIN, PROTTOTAL, ALT, AST, GGT, ALKPHOS, BILITOTAL, BILIDIRECT, MARTHA  OTHER:    Lab Results   Component Value Date    ANTONELLA 9.6 07/28/2023    PHOS 3.5 07/28/2023    MAG 2.3 07/28/2023       Anesthesia Plan    ASA Status:  3    NPO Status:  NPO Appropriate    Anesthesia Type: General.     - Airway: ETT   Induction: Intravenous, Propofol.   Maintenance: Balanced.        Consents    Anesthesia Plan(s) and associated risks, benefits, and realistic alternatives discussed. Questions answered and patient/representative(s) expressed understanding.     - Discussed:     - Discussed with:  Patient            Postoperative Care    Pain management: Multi-modal analgesia.   PONV prophylaxis: Ondansetron (or other 5HT-3), Dexamethasone or Solumedrol     Comments:                Dino Maguire MD

## 2023-07-28 NOTE — OP NOTE
PREOPERATIVE DIAGNOSIS: Right minimally displaced, valgus impacted femoral neck fracture.    POSTOPERATIVE DIAGNOSIS: Right minimally displaced, valgus impacted femoral neck fracture.    PROCEDURE: Closed reduction, percutaneous screw fixation, right femoral neck fracture.    SURGEON: rPanay Garcai MD.    ASSISTANT: Jayden Galvan PA-C.    ANESTHESIA: General w/ fascia iliaca.     ESTIMATED BLOOD LOSS: Minimal.    IMPLANTS: Synthes 6.5 mm partially threaded cannulated screws x3.     COMPLICATIONS: None apparent.    A skilled surgical assistant, Jayden Galvan PA-C, was necessary and utilized during the case to assist with patient positioning, prepping and draping, soft tissue retraction and completing the fracture fixation, wound closure, and dressing application.  The assistant was utilized throughout the entire case.    INDICATIONS: Joseph is a pleasant 62-year-old gentleman who sustained a mechanical fall onto the right hip. The patient had significant difficulty bearing weight on the leg following the injury and presented to the Emergency Department. Radiographs demonstrated a minimally displaced, impacted femoral neck fracture. To allow for more rapid return to weightbearing activity and limit pulmonary and musculoskeletal deconditioning associated with prolonged bed rest, operative intervention was recommended to stabilize the fracture. Given the limited displacement and angulation of the fracture, closed reduction and percutaneous screw fixation was offered as opposed to a hemiarthroplasty procedure. The risks of avascular necrosis to the femoral head and malunion/nonunion were discussed with the patient and family.  The benefits and risks of surgery were discussed in full with the patient and family and the patient provided informed consent to proceed.    DESCRIPTION OF PROCEDURE: The patient was identified in the preoperative holding area on the date of surgery. The operative site was marked with indelible  marker and the patient was brought back to the operating room and transferred to the [place] table in a supine position after successful administration of anesthesia. The left lower extremity was placed into a well-leg amado. The right lower extremity was placed into the traction boot.  Fluoroscopic imaging confirmed appropriate reduction of the right femoral neck fracture on both AP and lateral views. The right hip was prepped and draped in standard sterile fashion. A preoperative timeout was performed, identifying the correct patient, procedure, operative site, antibiotic administration and equipment necessary for the procedure.    Under fluoroscopic guidance, a guidewire was advanced in percutaneous fashion through the lateral thigh and advancing to the lateral cortex of the femur just proximal to the level of the lesser trochanter. A skin incision was made directly over the wire and soft tissue dissection was carefully carried down through the IT band fascia. The wire was then advanced across the fracture site into the femoral head. Two additional wires were advanced under fluoroscopic guidance in an inverted triangle arrangement. AP and lateral images confirmed appropriate positioning of the guidewires without overpenetration into the hip joint. After overdrilling the outer cortex over each wire, Synthes 6.5 mm partially threaded cannulated screws were advanced over each guidewire across the fracture site. Adequate compression was noted across the fracture with appropriate positioning of the screws maintaining adequate tip-apex distance at the femoral head and limiting penetration into the hip joint. The fracture remained stable during screw fixation. Fluoroscopic imaging confirmed appropriate length and positioning of all 3 screws. The fracture remained in a reduced position without angulation or displacement. Around the world views were performed to confirm appropriate positioning of the screws. The wound was  copiously irrigated. The IT band fascia and deep tissue were reapproximated with 2-0 Vicryl suture. Subcutaneous tissues and skin were reapproximated with interrupted 3-0 Monocryl and staples, respectively. Xeroform and a sterile dressing were applied. The patient was extubated and brought to the PACU in stable condition for further postoperative care.    Postoperatively, the patient will be allowed to bear weight on the right hip with an assistive device. The patient will begin work with therapy tomorrow. The patient will be placed on aspirin for DVT prophylaxis postoperatively. The patient will plan return to clinic for followup in 2 weeks for repeat evaluation including wound check, staple removal and AP pelvis and right hip x-rays.    Of note, all counts were correct at the conclusion of the case.    KODAK BORJAS MD

## 2023-07-28 NOTE — PROGRESS NOTES
Patient without new complaints this AM.  Pain present near fracture site, controlled at rest.  No chest pain.  He isn't sure if he tripped or passed out when he fell.   Denied other recent cardiac/pulmonary complaints past couple weeks.  Exam stable.  Will check echo before possible surgery given unclear fall trigger + cardiac history.  Will also have on tele for a day in case of arrhythmia though I suspect may have been mechanical fall.  Keep NPO this AM, likely surgery later today pending echo and further ortho discussion with patient about surgical plans.  Full progress note to follow.    ADDENDUM:    Echo returned with improved EF (50%).  His EF from 2020 was  Estimated LVEF 15% and more recently 30% 2021.  Fall trigger is unclear, suspect mechanical.  Continue cardiac monitoring.  Given no active cardiac complaints and improved function I don't see any reason cannot proceed with urgent surgery for fracture.

## 2023-07-28 NOTE — ANESTHESIA CARE TRANSFER NOTE
Patient: Joseph Miller    Procedure: Procedure(s):  CLOSED REDUCTION, RIGHT HIP, WITH PERCUTANEOUS PINNING       Diagnosis: Closed displaced fracture of right femoral neck (H) [S72.001A]  Diagnosis Additional Information: No value filed.    Anesthesia Type:   General     Note:    Oropharynx: oropharynx clear of all foreign objects and spontaneously breathing  Level of Consciousness: drowsy  Oxygen Supplementation: nasal cannula  Level of Supplemental Oxygen (L/min / FiO2): 4  Independent Airway: airway patency satisfactory and stable  Dentition: dentition unchanged  Vital Signs Stable: post-procedure vital signs reviewed and stable  Report to RN Given: handoff report given  Patient transferred to: PACU    Handoff Report: Identifed the Patient, Identified the Reponsible Provider, Reviewed the pertinent medical history, Discussed the surgical course, Reviewed Intra-OP anesthesia mangement and issues during anesthesia, Set expectations for post-procedure period and Allowed opportunity for questions and acknowledgement of understanding      Vitals:  Vitals Value Taken Time   /67 07/28/23 1315   Temp     Pulse 76 07/28/23 1318   Resp 17 07/28/23 1318   SpO2 93 % 07/28/23 1318   Vitals shown include unvalidated device data.    Electronically Signed By: ABHINAV Espinoza CRNA  July 28, 2023  1:19 PM

## 2023-07-28 NOTE — OR NURSING
Patient is from a group home - spoke to RN on floor and had them ask the patient if he has a legal guardian and per the patient he does not.  No group home listed.  Also called [facility] and they confirmed there is no legal guardian for the pt.

## 2023-07-28 NOTE — ED PROVIDER NOTES
Emergency Department Attending Supervision Note  7/27/2023  8:04 PM      I evaluated this patient in conjunction with Gardenia Salinas PA-C       Briefly, the patient presented with right hip pain and right knee pain. Earlier today, he was in his room when he fell and landed on both his right hip and knee. He has had pain those joints since then, and both are exacerbated by movement. He did not hit his head or lose consciousness at the time. He denies chest pain, back pain, abdominal pain, dizziness, or shortness of breath. He has no prior surgery on the right hip.       On my exam,   General:  Sitting on bed, comfortable appearing.   HENT:  No obvious trauma to head  Right Ear:  External ear normal.   Left Ear:  External ear normal.   Nose:  Nose normal.   Eyes:  Conjunctivae and EOM are normal.  Neck: Normal range of motion. Neck supple. No tracheal deviation present.   Pulm/Chest: No respiratory distress  M/S: Normal range of motion.  Mild tenderness to palpation of the right hip.  Right DP pulse +2.  Neuro: Alert. GCS 15.  Skin: Skin is warm and dry. No rash noted. Not diaphoretic.   Psych: Normal mood and affect. Behavior is normal.     Results:  XR Chest 1 View   Final Result   IMPRESSION: Normal size of cardiomediastinal silhouette status post median sternotomy and CABG. No focal airspace consolidation, pleural effusion or pneumothorax. No acute bony abnormality.      XR Pelvis w Hip Right 1 View   Final Result   IMPRESSION: There is an acute mildly displaced and impacted right femoral neck fracture in valgus alignment. Mild degenerative changes of the right hip.       NOTE: ABNORMAL REPORT      THE DICTATION ABOVE DESCRIBES AN ABNORMALITY FOR WHICH FOLLOW-UP IS NEEDED.       XR Knee Right 3 Views   Final Result   IMPRESSION: No acute fracture or malalignment. No significant degenerative changes or knee joint effusion. Osteopenia.        Brief MDM:  Joseph Miller is a very pleasant 62 year old year old  patient who presents to the emergency department with concern of a mechanical fall yesterday.  He landed on his right side.  He has continued to complain of right-sided pain to the hip and knee.  X-rays show impacted right femoral neck fracture.  He did not hit his head no other injury associated with this.  He will be admitted to the hospital for surgical intervention of this.        Diagnosis    ICD-10-CM    1. Closed displaced fracture of right femoral neck (H)  S72.001A             Remberto Mcdonnell DO Anderson, Robert James, DO  07/27/23 2102

## 2023-07-28 NOTE — PHARMACY-ADMISSION MEDICATION HISTORY
Pharmacist Admission Medication History    Admission medication history is complete. The information provided in this note is only as accurate as the sources available at the time of the update.    Medication reconciliation/reorder completed by provider prior to medication history? Yes    Information Source(s): Caregiver and Facility (TCU/NH/) medication list/MAR via in-person    Pertinent Information: Group home sent MAR    Changes made to PTA medication list:  Added: all medications  Deleted: None  Changed: None    Allergies reviewed with patient and updates made in EHR: no    Medication History Completed By: Deepika Stubbs RPH 7/27/2023 10:56 PM    Prior to Admission medications    Medication Sig Last Dose Taking? Auth Provider Long Term End Date   acetaminophen (TYLENOL) 325 MG tablet Take 650 mg by mouth 2 times daily Takes at 1200 and 2000 7/27/2023 at 1200 Yes Unknown, Entered By History     amLODIPine (NORVASC) 5 MG tablet Take 5 mg by mouth daily 7/27/2023 at 1200 Yes Unknown, Entered By History Yes    aspirin (ASA) 81 MG chewable tablet Take 81 mg by mouth daily 7/27/2023 at 1200 Yes Unknown, Entered By History     atorvastatin (LIPITOR) 40 MG tablet Take 40 mg by mouth At Bedtime 7/26/2023 at 2000 Yes Unknown, Entered By History Yes    furosemide (LASIX) 20 MG tablet Take 20 mg by mouth daily 7/27/2023 at 1200 Yes Unknown, Entered By History Yes    metoprolol succinate ER (TOPROL XL) 50 MG 24 hr tablet Take 50 mg by mouth daily 7/27/2023 at 1200 Yes Unknown, Entered By History Yes    omeprazole (PRILOSEC) 20 MG DR capsule Take 20 mg by mouth daily 7/27/2023 at 1200 Yes Unknown, Entered By History     potassium chloride ER (KLOR-CON M) 20 MEQ CR tablet Take 40 mEq by mouth daily 7/27/2023 at 1200 Yes Unknown, Entered By History     sertraline (ZOLOFT) 100 MG tablet Take 100 mg by mouth daily Take with 50mg tablet for total dose of 150mg daily 7/27/2023 at 1200 Yes Unknown, Entered By History Yes     sertraline (ZOLOFT) 50 MG tablet Take 50 mg by mouth daily Take with 100mg tablet for total dose of 150mg daily 7/27/2023 at 1200 Yes Unknown, Entered By History Yes    thiamine (B-1) 100 MG tablet Take 100 mg by mouth daily 7/27/2023 at 1200 Yes Unknown, Entered By History     traZODone (DESYREL) 50 MG tablet Take 50 mg by mouth At Bedtime 7/26/2023 at 2000 Yes Unknown, Entered By History Yes

## 2023-07-28 NOTE — ED NOTES
RECEIVING UNIT ED HANDOFF REVIEW    ED Nurse Handoff Report was reviewed by: Den Hooker RN on July 28, 2023 at 12:35 AM

## 2023-07-28 NOTE — CONSULTS
[facility]    Orthopedic Consultation    Joseph Miller MRN# [id number]   Age: 62 year old YOB: 1960     Date of Admission:  7/27/2023    Reason for consult: Right femoral neck fracture                Level of consult: Consult, follow and place orders           Assessment and Plan:   Assessment:   1.  impacted right femoral neck fracture in valgus alignment      Plan:   The patient's history and clinical/diagnostic findings were reviewed with the on-call orthopedic trauma surgeon. The patient is a candidate for percutaneous pinning/fixation of fracture which will be scheduled for today pending surgical optimization by hospital team. Dr. Pranay Garcia will discuss the risks, benefits, and outcomes of surgery while obtaining consent.       NPO   NWB/bedrest until postop.  Continue pain regimen.  Muscle relaxant available for PRN use.  Anticoagulation: hold if any      Please contact orthopedic trauma team if any questions or concerns arise.           Chief Complaint:   Right femoral neck fracture          History of Present Illness:   Joseph Miller is a pleasant 62 year old male who fell on 7/26 in his room (unwitnessed), landing on his right hip and knee. He did not hit his head. He continued to walk afterwards around the group home but had some limping. Then on 7/27 he had more trouble walking, not getting up well and was sent to the ED for evaluation. Imaging revealed right impacted femoral neck fracture. Our ortho trauma team was consulted for surgical intervention. History obtained per chart review as limited responses in conversation today.           Past Medical History:   No past medical history on file.          Past Surgical History:     Past Surgical History:   Procedure Laterality Date     IR CAROTID CEREBRAL ANGIOGRAM BILATERAL  3/18/2021             Social History:     Social History     Tobacco Use     Smoking status: Not on file     Smokeless tobacco: Not on file    Substance Use Topics     Alcohol use: Not on file             Family History:   No family history on file.          Immunizations:     VACCINE/DOSE   Diptheria   DPT   DTAP   HBIG   Hepatitis A   Hepatitis B   HIB   Influenza   Measles   Meningococcal   MMR   Mumps   Pneumococcal   Polio   Rubella   Small Pox   TDAP   Varicella   Zoster             Allergies:   No Known Allergies          Medications:     Current Facility-Administered Medications   Medication     acetaminophen (TYLENOL) tablet 975 mg     amLODIPine (NORVASC) tablet 5 mg     atorvastatin (LIPITOR) tablet 40 mg     HYDROmorphone (DILAUDID) injection 0.2 mg     HYDROmorphone (DILAUDID) injection 0.4 mg     lidocaine (LMX4) cream     lidocaine 1 % 0.1-1 mL     melatonin tablet 1 mg     metoprolol succinate ER (TOPROL XL) 24 hr tablet 50 mg     naloxone (NARCAN) injection 0.2 mg    Or     naloxone (NARCAN) injection 0.4 mg    Or     naloxone (NARCAN) injection 0.2 mg    Or     naloxone (NARCAN) injection 0.4 mg     ondansetron (ZOFRAN ODT) ODT tab 4 mg    Or     ondansetron (ZOFRAN) injection 4 mg     oxyCODONE (ROXICODONE) tablet 5 mg     oxyCODONE IR (ROXICODONE) half-tab 2.5 mg     pantoprazole (PROTONIX) EC tablet 40 mg     polyethylene glycol (MIRALAX) Packet 17 g     prochlorperazine (COMPAZINE) injection 10 mg    Or     prochlorperazine (COMPAZINE) tablet 10 mg    Or     prochlorperazine (COMPAZINE) suppository 25 mg     senna-docusate (SENOKOT-S/PERICOLACE) 8.6-50 MG per tablet 1 tablet    Or     senna-docusate (SENOKOT-S/PERICOLACE) 8.6-50 MG per tablet 2 tablet     sertraline (ZOLOFT) tablet 100 mg     sertraline (ZOLOFT) tablet 50 mg     sodium chloride (PF) 0.9% PF flush 3 mL     sodium chloride (PF) 0.9% PF flush 3 mL     traZODone (DESYREL) tablet 50 mg             Review of Systems:   CV: NEGATIVE for chest pain, palpitations or peripheral edema  C: NEGATIVE for fever, chills, change in weight  E/M: NEGATIVE for ear, mouth and throat  "problems  R: NEGATIVE for significant cough or SOB          Physical Exam:   All vitals have been reviewed  Patient Vitals for the past 24 hrs:   BP Temp Temp src Pulse Resp SpO2 Height Weight   07/28/23 0900 -- -- -- -- -- -- -- 81 kg (178 lb 9.2 oz)   07/28/23 0730 127/74 98.4  F (36.9  C) Oral 78 18 92 % -- --   07/28/23 0055 (!) 161/86 99.3  F (37.4  C) Oral 93 18 94 % -- --   07/27/23 1800 -- -- -- -- -- 94 % -- --   07/27/23 1717 124/78 97.7  F (36.5  C) Oral 83 18 94 % 1.765 m (5' 9.5\") 84.4 kg (186 lb)     No intake or output data in the 24 hours ending 07/28/23 0959    Constitutional: Pleasant, alert, appropriate, following commands.  HEENT: Head atraumatic normocephalic. Pupils equal round and reactive.  Respiratory: Unlabored breathing no audible wheeze  Cardiovascular: Regular rate and rhythm per pulses.  Skin: No rashes, no cyanosis, no edema.  Musculoskeletal: On exam of RLE: He is resting with knee held in 90 degrees flexion. No skin changes, skin intact. TTP over proximal femur. Actively dorsi and plantar flexes. Ranges toes. Sensation intact.             Data:   All laboratory data reviewed  Results for orders placed or performed during the hospital encounter of 07/27/23   XR Knee Right 3 Views     Status: None    Narrative    EXAM: XR KNEE RIGHT 3 VIEWS  LOCATION: [facility]  DATE: 7/27/2023    INDICATION: righ knee discomfort after a fall  COMPARISON: None.      Impression    IMPRESSION: No acute fracture or malalignment. No significant degenerative changes or knee joint effusion. Osteopenia.   XR Pelvis w Hip Right 1 View     Status: None    Narrative    EXAM: XR PELVIS AND HIP RIGHT 1 VIEW  LOCATION: [facility]  DATE: 7/27/2023    INDICATION: right hip pain after a fall, hard time weight bearing walking  COMPARISON: None.      Impression    IMPRESSION: There is an acute mildly displaced and impacted right femoral neck fracture in valgus " alignment. Mild degenerative changes of the right hip.     NOTE: ABNORMAL REPORT    THE DICTATION ABOVE DESCRIBES AN ABNORMALITY FOR WHICH FOLLOW-UP IS NEEDED.    XR Chest 1 View     Status: None    Narrative    EXAM: XR CHEST 1 VIEW  LOCATION: [facility]  DATE: 7/27/2023    INDICATION: pre op  COMPARISON: None available      Impression    IMPRESSION: Normal size of cardiomediastinal silhouette status post median sternotomy and CABG. No focal airspace consolidation, pleural effusion or pneumothorax. No acute bony abnormality.   Basic metabolic panel     Status: Abnormal   Result Value Ref Range    Sodium 138 136 - 145 mmol/L    Potassium 3.9 3.4 - 5.3 mmol/L    Chloride 102 98 - 107 mmol/L    Carbon Dioxide (CO2) 23 22 - 29 mmol/L    Anion Gap 13 7 - 15 mmol/L    Urea Nitrogen 20.0 8.0 - 23.0 mg/dL    Creatinine 1.42 (H) 0.67 - 1.17 mg/dL    Calcium 9.4 8.8 - 10.2 mg/dL    Glucose 128 (H) 70 - 99 mg/dL    GFR Estimate 57 (L) >60 mL/min/1.73m2   INR     Status: Normal   Result Value Ref Range    INR 0.98 0.85 - 1.15   CBC with platelets and differential     Status: Abnormal   Result Value Ref Range    WBC Count 7.1 4.0 - 11.0 10e3/uL    RBC Count 4.32 (L) 4.40 - 5.90 10e6/uL    Hemoglobin 13.8 13.3 - 17.7 g/dL    Hematocrit 40.0 40.0 - 53.0 %    MCV 93 78 - 100 fL    MCH 31.9 26.5 - 33.0 pg    MCHC 34.5 31.5 - 36.5 g/dL    RDW 13.1 10.0 - 15.0 %    Platelet Count 198 150 - 450 10e3/uL    % Neutrophils 66 %    % Lymphocytes 18 %    % Monocytes 12 %    % Eosinophils 3 %    % Basophils 1 %    % Immature Granulocytes 0 %    NRBCs per 100 WBC 0 <1 /100    Absolute Neutrophils 4.7 1.6 - 8.3 10e3/uL    Absolute Lymphocytes 1.3 0.8 - 5.3 10e3/uL    Absolute Monocytes 0.8 0.0 - 1.3 10e3/uL    Absolute Eosinophils 0.2 0.0 - 0.7 10e3/uL    Absolute Basophils 0.1 0.0 - 0.2 10e3/uL    Absolute Immature Granulocytes 0.0 <=0.4 10e3/uL    Absolute NRBCs 0.0 10e3/uL   Nt probnp inpatient     Status: Normal    Result Value Ref Range    N terminal Pro BNP Inpatient 460 0 - 900 pg/mL   CBC with platelets     Status: Abnormal   Result Value Ref Range    WBC Count 6.3 4.0 - 11.0 10e3/uL    RBC Count 4.27 (L) 4.40 - 5.90 10e6/uL    Hemoglobin 13.9 13.3 - 17.7 g/dL    Hematocrit 39.7 (L) 40.0 - 53.0 %    MCV 93 78 - 100 fL    MCH 32.6 26.5 - 33.0 pg    MCHC 35.0 31.5 - 36.5 g/dL    RDW 13.2 10.0 - 15.0 %    Platelet Count 192 150 - 450 10e3/uL   Magnesium     Status: Normal   Result Value Ref Range    Magnesium 2.3 1.7 - 2.3 mg/dL   Phosphorus     Status: Normal   Result Value Ref Range    Phosphorus 3.5 2.5 - 4.5 mg/dL   Basic metabolic panel     Status: Abnormal   Result Value Ref Range    Sodium 137 136 - 145 mmol/L    Potassium 4.0 3.4 - 5.3 mmol/L    Chloride 103 98 - 107 mmol/L    Carbon Dioxide (CO2) 22 22 - 29 mmol/L    Anion Gap 12 7 - 15 mmol/L    Urea Nitrogen 19.6 8.0 - 23.0 mg/dL    Creatinine 1.41 (H) 0.67 - 1.17 mg/dL    Calcium 9.6 8.8 - 10.2 mg/dL    Glucose 117 (H) 70 - 99 mg/dL    GFR Estimate 57 (L) >60 mL/min/1.73m2   EKG 12-lead, tracing only     Status: None   Result Value Ref Range    Systolic Blood Pressure  mmHg    Diastolic Blood Pressure  mmHg    Ventricular Rate 75 BPM    Atrial Rate 75 BPM    NY Interval 154 ms    QRS Duration 92 ms     ms    QTc 444 ms    P Axis 62 degrees    R AXIS 43 degrees    T Axis 76 degrees    Interpretation ECG       Sinus rhythm  Normal ECG  No previous ECGs available  Confirmed by GENERATED REPORT, COMPUTER ([id number]),  WOLFGANG BUTTERFIELD ([id number]) on 7/27/2023 8:38:46 PM     CBC with platelets differential     Status: Abnormal    Narrative    The following orders were created for panel order CBC with platelets differential.  Procedure                               Abnormality         Status                     ---------                               -----------         ------                     CBC with platelets and d...[[id number]]  Abnormal            Final result                  Please view results for these tests on the individual orders.          Attestation:  I have reviewed today's vital signs, notes, medications, labs and imaging with Dr. Pranay Garcia.  Amount of time performed on this consult: 50 minutes.    Nithya Corona PA-C  [facility]

## 2023-07-28 NOTE — ANESTHESIA PROCEDURE NOTES
Fascia iliaca Procedure Note    Pre-Procedure   Staff -        Anesthesiologist:  Dino Maguire MD       Performed By: anesthesiologist       Location: pre-op       Pre-Anesthestic Checklist: patient identified, IV checked, site marked, risks and benefits discussed, informed consent, monitors and equipment checked, pre-op evaluation, at physician/surgeon's request and post-op pain management  Timeout:       Correct Patient: Yes        Correct Procedure: Yes        Correct Site: Yes        Correct Position: Yes        Correct Laterality: Yes        Site Marked: Yes  Procedure Documentation  Procedure: Fascia iliaca       Laterality: right       Patient Position: supine       Patient Prep/Sterile Barriers: sterile gloves, mask, patient draped       Skin prep: Chloraprep       Local skin infiltrated with 2 mL of 1% lidocaine.        Needle Type: insulated       Ultrasound guided       1. Ultrasound was used to identify targeted nerve, plexus, vascular marker, or fascial plane and place a needle adjacent to it in real-time.       2. Ultrasound was used to visualize the spread of anesthetic in close proximity to the above referenced structure.       3. A permanent image is entered into the patient's record.       4. The visualized anatomic structures appeared normal.       5. There were no apparent abnormal pathologic findings.    Assessment/Narrative         The placement was negative for: blood aspirated, painful injection and site bleeding       Paresthesias: No.       Bolus given via needle..        Secured via.        Insertion/Infusion Method: Single Shot       Complications: none       Injection made incrementally with aspirations every 5 mL.    Medication(s) Administered   Bupivacaine 0.5% w/ 1:400K Epi (Injection) - Injection   20 mL - 7/28/2023 12:00:00 PM   Comments:  Patient tolerated the procedure well.    The surgeon has given a verbal order transferring care of this patient to me for the performance  "of a regional analgesia block for post-op pain control. It is requested of me because I am uniquely trained and qualified to perform this block and the surgeon is neither trained nor qualified to perform this procedure.Ultrasound Interpretation, peripheral nerve block.        FOR [facility] ([facility]) ONLY:   Pain Team Contact information: please page the Pain Team [address] [employer]. Search \"Pain\". During daytime hours, please page the attending first. At night please page the resident first.      "

## 2023-07-28 NOTE — ED NOTES
"[facility]  ED Nurse Handoff Report    ED Chief complaint: Fall      ED Diagnosis:   Final diagnoses:   Closed displaced fracture of right femoral neck (H)       Code Status: Full Code    Allergies: No Known Allergies    Patient Story: Patient brought in by ambulance with right hip pain.  He reports falling yesterday and landed on right side.  Pain progressively worsened and was unable to ambulate with assistance.  Focused Assessment:  Right hip pain. Xray shows right femoral neck fracture.    Treatments and/or interventions provided: Oral pain medications.  Patient's response to treatments and/or interventions: No change at this time.    To be done/followed up on inpatient unit:  Pain management.    Does this patient have any cognitive concerns?:  Patient has hx of stroke with associated cognitive delay.  Hx of dementia.    Activity level - Baseline/Home:  Independent  Activity Level - Current:   Stand with assist x2    Patient's Preferred language: English   Needed?: No    Isolation: None  Infection: Not Applicable  Patient tested for COVID 19 prior to admission: NO  Bariatric?: No    Vital Signs:   Vitals:    07/27/23 1717 07/27/23 1800   BP: 124/78    Pulse: 83    Resp: 18    Temp: 97.7  F (36.5  C)    TempSrc: Oral    SpO2: 94% 94%   Weight: 84.4 kg (186 lb)    Height: 1.765 m (5' 9.5\")        Cardiac Rhythm:     Was the PSS-3 completed:   Yes  What interventions are required if any?               Family Comments: Patient has a guardian and lives at a group home.  Provider spoke with guardian prior to admission.  OBS brochure/video discussed/provided to patient/family: No              Name of person given brochure if not patient: na              Relationship to patient: na    For the majority of the shift this patient's behavior was Green.   Behavioral interventions performed were none.    ED NURSE PHONE NUMBER: [phone]         "

## 2023-07-28 NOTE — ANESTHESIA POSTPROCEDURE EVALUATION
Patient: Joseph Miller    Procedure: Procedure(s):  CLOSED REDUCTION, RIGHT HIP, WITH PERCUTANEOUS PINNING       Anesthesia Type:  General    Note:  Disposition: Inpatient   Postop Pain Control: Uneventful            Sign Out: Well controlled pain   PONV: No   Neuro/Psych: Uneventful            Sign Out: Acceptable/Baseline neuro status   Airway/Respiratory: Uneventful            Sign Out: Acceptable/Baseline resp. status   CV/Hemodynamics: Uneventful            Sign Out: Acceptable CV status   Other NRE: NONE   DID A NON-ROUTINE EVENT OCCUR? No           Last vitals:  Vitals Value Taken Time   /72 07/28/23 1400   Temp 36.7  C (98  F) 07/28/23 1340   Pulse 75 07/28/23 1411   Resp 11 07/28/23 1411   SpO2 91 % 07/28/23 1411   Vitals shown include unvalidated device data.    Electronically Signed By: Dino Maguire MD  July 28, 2023  2:37 PM

## 2023-07-28 NOTE — PROGRESS NOTES
Date/Time: 7/27-7/28 8723-6833 AM  Summary:  Closed displaced fracture of right femoral neck  Mechanical Fall  Mental Status: A/Ox4  Activity/dangle:bedrest  Diet: NPO  Pain: Scheduled Tylenol and PRN Oxycodone  Sharpe/Voiding: inont  Tele/Restraints/Iso: NA  Skin: NA  02/LDA: VSS on RA,PIV SL  D/C Date: TBD

## 2023-07-28 NOTE — PROGRESS NOTES
[facility]    Medicine Progress Note - Hospitalist Service    Date of Admission:  7/27/2023    Assessment & Plan   Joseph Miller is a 62 year old male admitted on 7/27/2023 with right hip pain found to have right femur fracture.  He isn't sure if fall was due to trip or non-mechanical issue.    Closed displaced fracture right femoral neck  Fall, suspect mechanical though patient not certain:  -  Cardiac monitoring x 24 hours  -  Echo this AM given prior cardiac/cardiomyopathy history.  Patient denies any recent chest pain/increased SOB.  -  Ortho consult appreciated, pain control.  NPO for surgery later today.    CAD s/p CABG  Cardiomyopathy   Hypertension  Hyperlipidemia:  Summary:  Historically has had an improving EF.  He had under 20% in 2020 and more recently EF 30% with global hypokinesis on cardiac MRI in march 2021.  -  Repeating Echo this AM as above  ADDENDUM:  Echo shows improved EF, now 50% range.  *PTA meds include amlodipine 5mg daily, ASA 81mg daily, lipitor 40mg, toprol 50mg daily and furosemide 20mg daily  - hold PTA ASA and lasix for now, consider resuming tomorrow  - resume PTA amlodipine, toprol with hold parameters  - resume statin    GERD  Dysphagia:  - had video swallow on 7/18 with normal results  - PPI continued     CKD Stage 3:  Recent baseline has been 1.3-1.4  - Cr on admit is 1.42     Hx CVA  Dementia hx:  Lives in group home, will need to see how mobility is going after surgery.  He may need TCU/rehab.  - continue PTA zoloft and trazodone    Tobacco use disorder:  -  Cessation to be encouraged.  Nicotine patch added.    Medical Decision Making       Over 50 MINUTES SPENT BY ME on the date of service doing chart review, history, exam, documentation & further activities per the note.      Labs/Imaging Reviewed:  See Information above and Data section below    PPE Used:  Mask       Diet: NPO for surgery    DVT Prophylaxis: Per orthopedics  Sharpe Catheter: Not  "present  Lines: None     Cardiac Monitoring: Requested  Code Status: Full Code      Clinically Significant Risk Factors Present on Admission                # Drug Induced Platelet Defect: home medication list includes an antiplatelet medication        # Overweight: Estimated body mass index is 25.99 kg/m  as calculated from the following:    Height as of this encounter: 1.765 m (5' 9.5\").    Weight as of this encounter: 81 kg (178 lb 9.2 oz).            Disposition Plan      Expected Discharge Date: 07/29/2023      Destination: group home            Saul Clemente DO  Hospitalist Service  [facility]  Securely message with [employer] (more info)  Text page via [facility] Paging/Directory   ______________________________________________________________________    Interval History   Assumed care for the day, history reviewed.  This AM he felt relatively well aside from his right hip/leg.  No chest pain, sob, nausea.  He isn't certain whether he tripped and fell or passed out.  He thought he may have tripped/lost balance.  No other new complaints.    Physical Exam   Vital Signs: Temp: 97.7  F (36.5  C) Temp src: Axillary BP: 119/82 Pulse: 79   Resp: 12 SpO2: 91 % O2 Device: Nasal cannula Oxygen Delivery: 4 LPM  Weight: 178 lbs 9.16 oz    GEN:  Alert, oriented to self, place, why he is here.  Answers basic questions well.  Appears comfortable at rest in bed, no overt distress.  HEENT:  Normocephalic/atraumatic, no scleral icterus, no nasal discharge, mouth moist.  CV:  Regular rate and rhythm, no loud murmur/rub.  LUNGS:  Clear to auscultation bilaterally without rales/rhonchi/wheezing/retractions.  Symmetric chest rise on inhalation noted.  ABD:  Active bowel sounds, soft, non-tender/non-distended.  No rebound/guarding/rigidity.  EXT:  No edema.  No cyanosis.  Moving feet well.    SKIN:  Dry to touch, no exanthems noted in the visualized areas.    Medications    lactated ringers 100 mL/hr at 07/28/23 " 1447      acetaminophen  975 mg Oral Q8H    amLODIPine  5 mg Oral Daily    aspirin  325 mg Oral Daily    atorvastatin  40 mg Oral Daily    ceFAZolin  2 g Intravenous Q8H    metoprolol succinate ER  50 mg Oral Daily    nicotine  1 patch Transdermal Daily    nicotine   Transdermal Q8H    pantoprazole  40 mg Oral Daily    perflutren diluted 1mL to 2mL with saline  9 mL Intravenous Once    [START ON 7/29/2023] polyethylene glycol  17 g Oral Daily    senna-docusate  1 tablet Oral BID    Or    senna-docusate  2 tablet Oral BID    sertraline  100 mg Oral Daily    sertraline  50 mg Oral Daily    sodium chloride (PF)  10 mL Intravenous Once    sodium chloride (PF)  3 mL Intracatheter Q8H    traZODone  50 mg Oral At Bedtime       Data     Labs and Imaging results below reviewed today.    I have personally reviewed the following data over the past 24 hrs:    6.3  \   13.9   / 192     137 103 19.6 /  117 (H)   4.0 22 1.41 (H) \     Trop: N/A BNP: 460     INR:  0.98 PTT:  N/A   D-dimer:  N/A Fibrinogen:  N/A           Recent Results (from the past 24 hour(s))   XR Knee Right 3 Views    Narrative    EXAM: XR KNEE RIGHT 3 VIEWS  LOCATION: [facility]  DATE: 7/27/2023    INDICATION: righ knee discomfort after a fall  COMPARISON: None.      Impression    IMPRESSION: No acute fracture or malalignment. No significant degenerative changes or knee joint effusion. Osteopenia.   XR Pelvis w Hip Right 1 View    Narrative    EXAM: XR PELVIS AND HIP RIGHT 1 VIEW  LOCATION: [facility]  DATE: 7/27/2023    INDICATION: right hip pain after a fall, hard time weight bearing walking  COMPARISON: None.      Impression    IMPRESSION: There is an acute mildly displaced and impacted right femoral neck fracture in valgus alignment. Mild degenerative changes of the right hip.     NOTE: ABNORMAL REPORT    THE DICTATION ABOVE DESCRIBES AN ABNORMALITY FOR WHICH FOLLOW-UP IS NEEDED.    XR Chest 1 View     Narrative    EXAM: XR CHEST 1 VIEW  LOCATION: [facility]  DATE: 2023    INDICATION: pre op  COMPARISON: None available      Impression    IMPRESSION: Normal size of cardiomediastinal silhouette status post median sternotomy and CABG. No focal airspace consolidation, pleural effusion or pneumothorax. No acute bony abnormality.   Echocardiogram Complete   Result Value    LVEF  50-55%    Narrative    [id number]  [id number]  [id number]  [id number]^ENEIDA^INDIA^SOM     [facility]  Echocardiography Laboratory  [address] [place] [address]     Name: LUIS M HILL  MRN: [id number]  : 1960  Study Date: 2023 10:17 AM  Age: 62 yrs  Gender: Male  Patient Location: [facility]  Reason For Study: CHF  Ordering Physician: INDIA MONIQUE  Referring Physician: Radha Ref-Primary, Physician  Performed By: Erlinda Delgado RDCS     BSA: 2.0 m2  Height: 69 in  Weight: 186 lb  HR: 80  BP: 127/74 mmHg  ______________________________________________________________________________  Procedure  Complete Portable Echo Adult. Optison (NDC #[id number]) given intravenously.  ______________________________________________________________________________  Interpretation Summary     The left ventricle is normal in size.  The visual ejection fraction is 50-55%.  Diastolic Doppler findings (E/E' ratio and/or other parameters) suggest left  ventricular filling pressures are normal.  There is mild to moderate apical wall hypokinesis.  No significant valvular heart disease.  ______________________________________________________________________________  Left Ventricle  The left ventricle is normal in size. There is normal left ventricular wall  thickness. Diastolic Doppler findings (E/E' ratio and/or other parameters)  suggest left ventricular filling pressures are normal. The visual ejection  fraction is 50-55%. There is mild to moderate apical wall hypokinesis.     Right Ventricle  The right  ventricle is normal in size and function.     Atria  Normal left atrial size. Right atrial size is normal. There is no color  Doppler evidence of an atrial shunt.     Mitral Valve  The mitral valve leaflets are mildly thickened. There is trace mitral  regurgitation.     Tricuspid Valve  There is trace tricuspid regurgitation.     Aortic Valve  There is trivial trileaflet aortic sclerosis. No aortic regurgitation is  present.     Pulmonic Valve  There is trace pulmonic valvular regurgitation.     Vessels  Normal size aorta. Borderline aortic root dilatation.     Pericardium  There is no pericardial effusion.     Rhythm  Sinus rhythm was noted.  ______________________________________________________________________________  MMode/2D Measurements & Calculations  IVSd: 1.2 cm     LVIDd: 4.2 cm  LVIDs: 2.9 cm  LVPWd: 0.87 cm  FS: 32.4 %  LV mass(C)d: 143.6 grams  LV mass(C)dI: 71.7 grams/m2  Ao root diam: 3.8 cm  LA dimension: 3.0 cm  asc Aorta Diam: 3.1 cm  LA/Ao: 0.79  LA Volume (BP): 30.8 ml  LA Volume Index (BP): 15.4 ml/m2  RWT: 0.41  TAPSE: 1.1 cm     Doppler Measurements & Calculations  MV E max blane: 43.3 cm/sec  MV A max blane: 64.3 cm/sec  MV E/A: 0.67  MV dec time: 0.19 sec  PA acc time: 0.10 sec  E/E' av.4  Lateral E/e': 5.9  Medial E/e': 6.9  RV S Blane: 9.8 cm/sec     ______________________________________________________________________________  Report approved by: Gagandeep Hope 2023 11:06 AM

## 2023-07-28 NOTE — ANESTHESIA PROCEDURE NOTES
Airway       Patient location during procedure: OR       Procedure Start/Stop Times: 7/28/2023 12:22 PM  Staff -        Anesthesiologist:  Dino Maguire MD       CRNA: Geovanna Santos APRN CRNA       Other Anesthesia Staff: Chelsey Ruby       Performed By: SRNA  Consent for Airway        Urgency: elective  Indications and Patient Condition       Indications for airway management: kathi-procedural       Induction type:intravenous      Final Airway Details       Final airway type: endotracheal airway       Successful airway: ETT - single  Endotracheal Airway Details        ETT size (mm): 8.0       Cuffed: yes       Successful intubation technique: direct laryngoscopy       DL Blade Type: MAC 4       Grade View of Cords: 1       Adjucts: stylet       Position: Right       Measured from: gums/teeth       Secured at (cm): 23       Bite block used: None    Post intubation assessment        Placement verified by: capnometry        Number of attempts at approach: 1       Number of other approaches attempted: 0       Secured with: pink tape       Ease of procedure: easy       Dentition: Intact and Unchanged    Medication(s) Administered   Medication Administration Time: 7/28/2023 12:22 PM

## 2023-07-29 ENCOUNTER — APPOINTMENT (OUTPATIENT)
Dept: PHYSICAL THERAPY | Facility: CLINIC | Age: 63
DRG: 481 | End: 2023-07-29
Attending: PHYSICIAN ASSISTANT
Payer: COMMERCIAL

## 2023-07-29 LAB
GLUCOSE SERPL-MCNC: 137 MG/DL (ref 70–99)
HGB BLD-MCNC: 13 G/DL (ref 13.3–17.7)

## 2023-07-29 PROCEDURE — 250N000013 HC RX MED GY IP 250 OP 250 PS 637: Performed by: HOSPITALIST

## 2023-07-29 PROCEDURE — 250N000013 HC RX MED GY IP 250 OP 250 PS 637: Performed by: PHYSICIAN ASSISTANT

## 2023-07-29 PROCEDURE — 82947 ASSAY GLUCOSE BLOOD QUANT: CPT | Performed by: INTERNAL MEDICINE

## 2023-07-29 PROCEDURE — 250N000013 HC RX MED GY IP 250 OP 250 PS 637: Performed by: INTERNAL MEDICINE

## 2023-07-29 PROCEDURE — 36415 COLL VENOUS BLD VENIPUNCTURE: CPT | Performed by: PHYSICIAN ASSISTANT

## 2023-07-29 PROCEDURE — 97530 THERAPEUTIC ACTIVITIES: CPT | Mod: GP

## 2023-07-29 PROCEDURE — 85018 HEMOGLOBIN: CPT | Performed by: PHYSICIAN ASSISTANT

## 2023-07-29 PROCEDURE — 99232 SBSQ HOSP IP/OBS MODERATE 35: CPT | Performed by: INTERNAL MEDICINE

## 2023-07-29 PROCEDURE — 120N000001 HC R&B MED SURG/OB

## 2023-07-29 PROCEDURE — 97162 PT EVAL MOD COMPLEX 30 MIN: CPT | Mod: GP

## 2023-07-29 PROCEDURE — 250N000011 HC RX IP 250 OP 636: Mod: JZ | Performed by: PHYSICIAN ASSISTANT

## 2023-07-29 RX ADMIN — SERTRALINE HYDROCHLORIDE 100 MG: 100 TABLET ORAL at 08:04

## 2023-07-29 RX ADMIN — POLYETHYLENE GLYCOL 3350 17 G: 17 POWDER, FOR SOLUTION ORAL at 08:06

## 2023-07-29 RX ADMIN — THIAMINE HCL TAB 100 MG 100 MG: 100 TAB at 14:24

## 2023-07-29 RX ADMIN — METOPROLOL SUCCINATE 50 MG: 50 TABLET, EXTENDED RELEASE ORAL at 08:05

## 2023-07-29 RX ADMIN — SERTRALINE HYDROCHLORIDE 50 MG: 50 TABLET ORAL at 08:05

## 2023-07-29 RX ADMIN — ACETAMINOPHEN 975 MG: 325 TABLET, FILM COATED ORAL at 00:05

## 2023-07-29 RX ADMIN — ACETAMINOPHEN 975 MG: 325 TABLET, FILM COATED ORAL at 08:05

## 2023-07-29 RX ADMIN — ASPIRIN 325 MG: 325 TABLET, COATED ORAL at 08:05

## 2023-07-29 RX ADMIN — SENNOSIDES AND DOCUSATE SODIUM 1 TABLET: 50; 8.6 TABLET ORAL at 21:13

## 2023-07-29 RX ADMIN — ATORVASTATIN CALCIUM 40 MG: 40 TABLET, FILM COATED ORAL at 08:03

## 2023-07-29 RX ADMIN — AMLODIPINE BESYLATE 5 MG: 5 TABLET ORAL at 08:03

## 2023-07-29 RX ADMIN — SENNOSIDES AND DOCUSATE SODIUM 1 TABLET: 50; 8.6 TABLET ORAL at 08:03

## 2023-07-29 RX ADMIN — NICOTINE 1 PATCH: 21 PATCH, EXTENDED RELEASE TRANSDERMAL at 08:06

## 2023-07-29 RX ADMIN — TRAZODONE HYDROCHLORIDE 50 MG: 50 TABLET ORAL at 21:13

## 2023-07-29 RX ADMIN — PANTOPRAZOLE SODIUM 40 MG: 40 TABLET, DELAYED RELEASE ORAL at 08:05

## 2023-07-29 RX ADMIN — CEFAZOLIN SODIUM 2 G: 2 INJECTION, SOLUTION INTRAVENOUS at 04:26

## 2023-07-29 RX ADMIN — ACETAMINOPHEN 975 MG: 325 TABLET, FILM COATED ORAL at 17:57

## 2023-07-29 ASSESSMENT — ACTIVITIES OF DAILY LIVING (ADL)
ADLS_ACUITY_SCORE: 36
ADLS_ACUITY_SCORE: 32
ADLS_ACUITY_SCORE: 32
ADLS_ACUITY_SCORE: 36
ADLS_ACUITY_SCORE: 32
ADLS_ACUITY_SCORE: 36
ADLS_ACUITY_SCORE: 32
ADLS_ACUITY_SCORE: 36
ADLS_ACUITY_SCORE: 32
ADLS_ACUITY_SCORE: 34

## 2023-07-29 NOTE — PLAN OF CARE
VSS, CMS intact. Up with 2 and misty nova. Pain managed with tylenol. Dressing C/D/I. Incontinent of bowel and bladder. Tele NSR. A&OX3. Discharge pending placement.

## 2023-07-29 NOTE — PROGRESS NOTES
[facility]    Medicine Progress Note - Hospitalist Service    Date of Admission:  7/27/2023    Assessment & Plan   Joseph Miller is a 62 year old male admitted on 7/27/2023 with right hip pain found to have right femur fracture.  He isn't sure if fall was due to trip or non-mechanical issue.     Closed displaced fracture right femoral neck s/p mechanical fall (details unclear)       POD 1 - percutaneous pinning, right hip         Cardiac monitoring x 24 hours to eval for any arrythmias as clinical details surrounding fall uncertain    Doing fairly well post-op.    Further post-op orders per surgical service including pain control, DVT prophylaxis and activity orders (appears on ASA 325mg daily currently for rec DVT prophylaxis)     2. CAD s/p CABG  Cardiomyopathy   Hypertension  Hyperlipidemia:  Summary:  Historically has had an improving EF.  He had under 20% in 2020, ECHO reviewed 3/2021 with EF 25-30% and more recently EF 30% with global hypokinesis on cardiac MRI in march 2021.  -  ECHO 7/28/23 with improved EF 50-55%    *PTA meds include amlodipine 5mg daily, ASA 81mg daily, lipitor 40mg, toprol 50mg daily and furosemide 20mg daily  - hold PTA ASA and lasix for now, consider resuming tomorrow  - resume PTA amlodipine, toprol with hold parameters  - resume statin     3. GERD  Dysphagia:  - had video swallow on 7/18 with normal results  - PPI continued     4. CKD Stage 3:  Recent baseline has been 1.3-1.4  - Cr on admit is 1.42 *1.41 7/28)  BMP in the am     5. Hx CVA  Dementia hx:  Lives in group home, will need to see how mobility is going after surgery.  He may need TCU/rehab.  - continue PTA zoloft and trazodone     6. Tobacco use disorder:  -  Cessation to be encouraged.  Nicotine patch ordered and in place       Medical Decision Making       45 MINUTES SPENT BY ME on the date of service doing chart review, history, exam, documentation & further activities per the note.        PPE  "Worn:  Mask, gloves     Diet: Advance Diet as Tolerated: Regular Diet Adult    DVT Prophylaxis: defer to surgical service - currently ASA 325mg/day ordered  Sharpe Catheter: Not present  Lines: None     Cardiac Monitoring: None  Code Status: Full Code      Clinically Significant Risk Factors                         # Overweight: Estimated body mass index is 25.99 kg/m  as calculated from the following:    Height as of this encounter: 1.765 m (5' 9.5\").    Weight as of this encounter: 81 kg (178 lb 9.2 oz)., PRESENT ON ADMISSION          Disposition Plan     Expected Discharge Date: 07/29/2023      Destination: group home            Berna Maldonado DO  Hospitalist Service  [facility]  Securely message with [employer] (more info)  Text page via [employer] Paging/Directory   ______________________________________________________________________    Interval History     \"My leg hurts\".  \"I am eating pot roast for dinner now\"  Currently denies any CP, SOB, HA or clear diarrhea.  He thinks it is evening currently and that he is eating dinner.  No new concerns overnight per nursing.     Physical Exam   Vital Signs: Temp: 98.9  F (37.2  C) Temp src: Oral BP: (!) 141/70 Pulse: 77   Resp: 18 SpO2: 92 % O2 Device: Nasal cannula Oxygen Delivery: 4 LPM  Weight: 178 lbs 9.16 oz    GEN:  Alert, awake, no overt respiratory distress.  Appears to be in no acute distress; trying to eat his potroast  HEENT:  Normocephalic/atraumatic, no scleral icterus, no nasal discharge, mouth moist.  CV:  Regular rate and rhythm, somewhat distant but no loud murmur or JVD.  S1 + S2 noted, no S3 or S4.  LUNGS:  Clear to auscultation ant/lat bilaterally without clear rales/rhonchi/wheezing/retractions. Symmetric chest rise on inhalation noted.  ABD:  Active bowel sounds, soft, non-tender/non-distended.  No rebound/guarding/rigidity.  EXT:  SCD's in place bilaterally.  No cyanosis.    SKIN:  Dry to touch, no exanthems noted in " the visualized areas.  PSYCH:  calm, cooperative    Medications    lactated ringers 100 mL/hr at 23 1447      acetaminophen  975 mg Oral Q8H    amLODIPine  5 mg Oral Daily    aspirin  325 mg Oral Daily    atorvastatin  40 mg Oral Daily    metoprolol succinate ER  50 mg Oral Daily    nicotine  1 patch Transdermal Daily    nicotine   Transdermal Q8H    pantoprazole  40 mg Oral Daily    perflutren diluted 1mL to 2mL with saline  9 mL Intravenous Once    polyethylene glycol  17 g Oral Daily    senna-docusate  1 tablet Oral BID    Or    senna-docusate  2 tablet Oral BID    sertraline  100 mg Oral Daily    sertraline  50 mg Oral Daily    sodium chloride (PF)  10 mL Intravenous Once    sodium chloride (PF)  3 mL Intracatheter Q8H    traZODone  50 mg Oral At Bedtime       Data     Labs and Imaging results below reviewed today.  Recent Labs   Lab 23  0823   WBC  --  6.3 7.1   HGB 13.0* 13.9 13.8   HCT  --  39.7* 40.0   MCV  --  93 93   PLT  --  192 198     Recent Labs   Lab 23  0823   NA  --  137 138   POTASSIUM  --  4.0 3.9   CHLORIDE  --  103 102   CO2  --  22 23   ANIONGAP  --  12 13   * 117* 128*   BUN  --  19.6 20.0   CR  --  1.41* 1.42*   GFRESTIMATED  --  57* 57*   ANTONELLA  --  9.6 9.4     7-Day Micro Results       No results found for the last 168 hours.            Recent Results (from the past 24 hour(s))   Echocardiogram Complete   Result Value    LVEF  50-55%    Narrative    [id number]  [id number]  [id number]  [id number]^ENEIDA^INDIA^SOM     [facility]  Echocardiography Laboratory  [facility] [place] [address]     Name: LUIS M HILL  MRN: [id number]  : 1960  Study Date: 2023 10:17 AM  Age: 62 yrs  Gender: Male  Patient Location: [facility]  Reason For Study: CHF  Ordering Physician: INDIA MONIQUE  Referring Physician: No Ref-Primary, Physician  Performed By: Erlinda Delgado RDCS     BSA: 2.0  m2  Height: 69 in  Weight: 186 lb  HR: 80  BP: 127/74 mmHg  ______________________________________________________________________________  Procedure  Complete Portable Echo Adult. Optison (NDC #[id number]) given intravenously.  ______________________________________________________________________________  Interpretation Summary     The left ventricle is normal in size.  The visual ejection fraction is 50-55%.  Diastolic Doppler findings (E/E' ratio and/or other parameters) suggest left  ventricular filling pressures are normal.  There is mild to moderate apical wall hypokinesis.  No significant valvular heart disease.  ______________________________________________________________________________  Left Ventricle  The left ventricle is normal in size. There is normal left ventricular wall  thickness. Diastolic Doppler findings (E/E' ratio and/or other parameters)  suggest left ventricular filling pressures are normal. The visual ejection  fraction is 50-55%. There is mild to moderate apical wall hypokinesis.     Right Ventricle  The right ventricle is normal in size and function.     Atria  Normal left atrial size. Right atrial size is normal. There is no color  Doppler evidence of an atrial shunt.     Mitral Valve  The mitral valve leaflets are mildly thickened. There is trace mitral  regurgitation.     Tricuspid Valve  There is trace tricuspid regurgitation.     Aortic Valve  There is trivial trileaflet aortic sclerosis. No aortic regurgitation is  present.     Pulmonic Valve  There is trace pulmonic valvular regurgitation.     Vessels  Normal size aorta. Borderline aortic root dilatation.     Pericardium  There is no pericardial effusion.     Rhythm  Sinus rhythm was noted.  ______________________________________________________________________________  MMode/2D Measurements & Calculations  IVSd: 1.2 cm     LVIDd: 4.2 cm  LVIDs: 2.9 cm  LVPWd: 0.87 cm  FS: 32.4 %  LV mass(C)d: 143.6 grams  LV mass(C)dI: 71.7  grams/m2  Ao root diam: 3.8 cm  LA dimension: 3.0 cm  asc Aorta Diam: 3.1 cm  LA/Ao: 0.79  LA Volume (BP): 30.8 ml  LA Volume Index (BP): 15.4 ml/m2  RWT: 0.41  TAPSE: 1.1 cm     Doppler Measurements & Calculations  MV E max blane: 43.3 cm/sec  MV A max blane: 64.3 cm/sec  MV E/A: 0.67  MV dec time: 0.19 sec  PA acc time: 0.10 sec  E/E' av.4  Lateral E/e': 5.9  Medial E/e': 6.9  RV S Blane: 9.8 cm/sec     ______________________________________________________________________________  Report approved by: Gagandeep Hope 2023 11:06 AM

## 2023-07-29 NOTE — CONSULTS
Care Management Initial Consult    General Information  Assessment completed with: Patient, VM-chart review, Joseph  Type of CM/SW Visit: CM Role Introduction    Primary Care Provider verified and updated as needed: Yes (per chart review, patient sees Abe Hendersonmsjm with [facility])   Readmission within the last 30 days:        Reason for Consult: discharge planning  Advance Care Planning:            Communication Assessment  Patient's communication style: spoken language (English or Bilingual)    Hearing Difficulty or Deaf: no   Wear Glasses or Blind: no    Cognitive  Cognitive/Neuro/Behavioral: .WDL except  Level of Consciousness: alert  Arousal Level: arouses to voice  Orientation: disoriented to, time  Mood/Behavior: flat affect  Best Language: 0 - No aphasia  Speech: slow    Living Environment:   People in home: facility resident     Current living Arrangements: group home      Able to return to prior arrangements: yes       Family/Social Support:  Care provided by: self, homecare agency  Provides care for: no one  Marital Status: Single  Facility resident(s)/Staff          Description of Support System: Involved, Supportive         Current Resources:   Patient receiving home care services:       Community Resources:    Equipment currently used at home: none  Supplies currently used at home:      Employment/Financial:  Employment Status:          Financial Concerns:             Does the patient's insurance plan have a 3 day qualifying hospital stay waiver?  No    Lifestyle & Psychosocial Needs:  Social Determinants of Health     Tobacco Use: Not on file   Alcohol Use: Not on file   Financial Resource Strain: Not on file   Food Insecurity: Not on file   Transportation Needs: Not on file   Physical Activity: Not on file   Stress: Not on file   Social Connections: Not on file   Intimate Partner Violence: Not on file   Depression: Not on file   Housing Stability: Not on file       Functional Status:  Prior to  "admission patient needed assistance:              Mental Health Status:          Chemical Dependency Status:                Values/Beliefs:  Spiritual, Cultural Beliefs, Orthodoxy Practices, Values that affect care:                 Additional Information:  Consult for discharge planning. Patient admitted on 7/27/2023 with right hip pain found to have right femur fracture. Per chart review, patient lives in a group home. Writer [facility] Writer spoke to patient who confirmed that this is his group home and stated that the main contact is Rosio (listed as emergency contact \"friend\"). Writer asked if he has a primary doctor and he reported that he was unsure of name of provider or clinic.     Writer discussed transitional care and patient is open to this without any preferences on specific facility. He would like to be in [place]. Writer sent referrals to [place], [facility], [facility] and [facility] via [facility].     Writer called Rosio and left a message for her requesting a call back. Writer looked at Care Everywhere and patient has seen Dr. Abe Colindres with [facility] many times. Will likely need transport but not discussed.     ELMER Maddox      "

## 2023-07-29 NOTE — PLAN OF CARE
Pt A/Ox4, VSS on RA, denies pain, CMS intact, due to work with PT today, POD 1,  dressing CDI, will continue to monitor

## 2023-07-29 NOTE — PROGRESS NOTES
Orthopedic Surgery  Joseph Miller  07/29/2023     Admit Date:  7/27/2023    POD: 1 Day Post-Op   Procedure(s):  CLOSED REDUCTION, RIGHT HIP, WITH PERCUTANEOUS PINNING     Patient up in chair, transferring with PT.   Pain controlled.  Tolerating oral intake.    Denies nausea or vomiting  Denies chest pain or shortness of breath    Temp:  [97.7  F (36.5  C)-98.9  F (37.2  C)] 97.8  F (36.6  C)  Pulse:  [66-82] 66  Resp:  [11-20] 18  BP: (105-141)/(64-82) 136/73  SpO2:  [91 %-98 %] 95 %    Alert and oriented  Dressing is clean, dry, and intact.   Minimal erythema of the surrounding skin.   Bilateral calves are soft, non-tender.  Right lower extremity is NVI.  Sensation intact bilateral lower extremities  Passive dorsi and plantar flexion bilaterally  +Dp pulse    Labs:  Recent Labs   Lab Test 07/29/23  0707 07/28/23  0809 07/27/23 2014   WBC  --  6.3 7.1   HGB 13.0* 13.9 13.8   PLT  --  192 198     Recent Labs   Lab Test 07/27/23 2014   INR 0.98     No lab results found.      1. PLAN:    mg for DVT prophylaxis.     Mobilize with PT/OT    WBAT   Continue current pain regimen   Dressings: Keep intact.  Change if >60% saturated or peeling off.    Follow-up: 2 weeks post-op with Dr. Garcia    2. Disposition   Anticipate d/c to TCU when medically cleared and progressing in PT.    Nithya Corona PA-C

## 2023-07-29 NOTE — PLAN OF CARE
07/29/23 1145   Appointment Info   Signing Clinician's Name / Credentials (PT) Angelique Davila DPT   Living Environment   People in Home facility resident   Living Environment Comments pt admitted from group home, pt unable to provide reliable hx information   Self-Care   Activity/Exercise/Self-Care Comment Pt reports he is IND at baseline, questionable historian, confused this AM. Pt admitted after fall   General Information   Onset of Illness/Injury or Date of Surgery 07/27/23   Referring Physician Jayden Galvan PA-C   Pertinent History of Current Problem (include personal factors and/or comorbidities that impact the POC) Joseph Miller is a 62 year old male admitted on 7/27/2023 with right hip pain found to have right femur fracture.  He isn't sure if fall was due to trip or non-mechanical issue   Weight-Bearing Status - LLE weight-bearing as tolerated   Weight-Bearing Status - RLE weight-bearing as tolerated   Cognition   Affect/Mental Status (Cognition) confused   Orientation Status (Cognition) person;place   Follows Commands (Cognition) does not follow one-step commands   Cognitive Status Comments Inconsistent command following, needs redirection to task   Posture    Posture Forward head position   Range of Motion (ROM)   ROM Comment WFL able to tolerate 90/90 sitting EOB   Strength (Manual Muscle Testing)   Strength Comments Pt demonstrates gross functional weakness   Bed Mobility   Comment, (Bed Mobility) Supine > sitting EOB mod A x 2   Transfers   Comment, (Transfers) STS FWW poor weight acceptable on RLE,   Gait/Stairs (Locomotion)   Comment, (Gait/Stairs) Unable to safely ambulate   Balance   Balance Comments Impaired static/dynamic sitting balance, impaired standing static balance with FWW   Clinical Impression   Criteria for Skilled Therapeutic Intervention Yes, treatment indicated   PT Diagnosis (PT) impaired IND with mobility from baseline   Influenced by the following impairments functional  weakness, impaired balance, impaired activity tolerance, cognition, pain   Functional limitations due to impairments impaired IND and safety with functional mobility   Clinical Presentation (PT Evaluation Complexity) Evolving/Changing   Clinical Presentation Rationale level of assist, clinical judgement   Clinical Decision Making (Complexity) moderate complexity   Planned Therapy Interventions (PT) balance training;bed mobility training;cryotherapy;gait training;home exercise program;patient/family education;strengthening;transfer training   Risk & Benefits of therapy have been explained evaluation/treatment results reviewed;care plan/treatment goals reviewed;risks/benefits reviewed;patient   PT Total Evaluation Time   PT Eval, Moderate Complexity Minutes (10195) 15   Plan of Care Review   Plan of Care Reviewed With patient   Physical Therapy Goals   PT Frequency 5x/week   PT Predicted Duration/Target Date for Goal Attainment 08/05/23   PT Goals Bed Mobility;Transfers;Gait   Interventions   Interventions Quick Adds Therapeutic Activity   Therapeutic Activity   Therapeutic Activities: dynamic activities to improve functional performance Minutes (53738) 24   Symptoms Noted During/After Treatment Increased pain;Fatigue   Treatment Detail/Skilled Intervention Eval complete, treatment indicated. Pt confused, does not follow commands well, < 25% during session. inc time for all mobility. Pt WBAT to RLE. Supine > sitting EOB mod A x 2, 2 posterior LOB, cues to keep UE support on bedrail. Poor overall safety awareness. Scooting towards EOB, TC/VC, significant time to obtain ft flat, impaired sitting balanc,e fluctuating CGA-min A sitting EOB. STS with FWW, mod A x 2, RLE buckling in standing initially, cues for improved quad activaiton. SPT bed > chair, very unsteady, sat impulsively. STS from recliner with FWW max A x 2, poor standing tolerance. Assist for proper sitting position in recliner, BLE elevated. Ortho PA present  alarm on and needs in reach.   PT Discharge Planning   PT Plan Progress OOB mobility as able. STS with misty steady until strength/balance improves   PT Discharge Recommendation (DC Rec) Transitional Care Facility   PT Rationale for DC Rec recommend DC to TCU as pt currently A x 2 with all mobility, use of misty steady for safe transfers, pt currently far below baseline for functional mobility at this time   PT Brief overview of current status A x 2 misty steady   Total Session Time   Timed Code Treatment Minutes 24   Total Session Time (sum of timed and untimed services) 39

## 2023-07-30 LAB
ANION GAP SERPL CALCULATED.3IONS-SCNC: 9 MMOL/L (ref 7–15)
BUN SERPL-MCNC: 28.3 MG/DL (ref 8–23)
CALCIUM SERPL-MCNC: 9.5 MG/DL (ref 8.8–10.2)
CHLORIDE SERPL-SCNC: 104 MMOL/L (ref 98–107)
CREAT SERPL-MCNC: 1.35 MG/DL (ref 0.67–1.17)
DEPRECATED HCO3 PLAS-SCNC: 26 MMOL/L (ref 22–29)
GFR SERPL CREATININE-BSD FRML MDRD: 60 ML/MIN/1.73M2
GLUCOSE SERPL-MCNC: 122 MG/DL (ref 70–99)
HGB BLD-MCNC: 12.5 G/DL (ref 13.3–17.7)
POTASSIUM SERPL-SCNC: 3.7 MMOL/L (ref 3.4–5.3)
SODIUM SERPL-SCNC: 139 MMOL/L (ref 136–145)

## 2023-07-30 PROCEDURE — 80048 BASIC METABOLIC PNL TOTAL CA: CPT | Performed by: INTERNAL MEDICINE

## 2023-07-30 PROCEDURE — 250N000013 HC RX MED GY IP 250 OP 250 PS 637: Performed by: INTERNAL MEDICINE

## 2023-07-30 PROCEDURE — 250N000013 HC RX MED GY IP 250 OP 250 PS 637: Performed by: PHYSICIAN ASSISTANT

## 2023-07-30 PROCEDURE — 36415 COLL VENOUS BLD VENIPUNCTURE: CPT | Performed by: PHYSICIAN ASSISTANT

## 2023-07-30 PROCEDURE — 120N000001 HC R&B MED SURG/OB

## 2023-07-30 PROCEDURE — 250N000013 HC RX MED GY IP 250 OP 250 PS 637: Performed by: HOSPITALIST

## 2023-07-30 PROCEDURE — 99232 SBSQ HOSP IP/OBS MODERATE 35: CPT | Performed by: INTERNAL MEDICINE

## 2023-07-30 PROCEDURE — 85018 HEMOGLOBIN: CPT | Performed by: PHYSICIAN ASSISTANT

## 2023-07-30 RX ORDER — AMOXICILLIN 250 MG
1 CAPSULE ORAL 2 TIMES DAILY PRN
Qty: 30 TABLET | Refills: 0 | DISCHARGE
Start: 2023-07-30

## 2023-07-30 RX ORDER — ACETAMINOPHEN 325 MG/1
975 TABLET ORAL EVERY 8 HOURS
Qty: 100 TABLET | Refills: 0 | DISCHARGE
Start: 2023-07-30

## 2023-07-30 RX ORDER — ASPIRIN 325 MG
325 TABLET, DELAYED RELEASE (ENTERIC COATED) ORAL DAILY
Qty: 42 TABLET | Refills: 0 | DISCHARGE
Start: 2023-07-31

## 2023-07-30 RX ORDER — OXYCODONE HYDROCHLORIDE 5 MG/1
5 TABLET ORAL EVERY 4 HOURS PRN
Qty: 10 TABLET | Refills: 0 | Status: SHIPPED | OUTPATIENT
Start: 2023-07-30

## 2023-07-30 RX ADMIN — THIAMINE HCL TAB 100 MG 100 MG: 100 TAB at 08:20

## 2023-07-30 RX ADMIN — SERTRALINE HYDROCHLORIDE 100 MG: 100 TABLET ORAL at 08:19

## 2023-07-30 RX ADMIN — NICOTINE 1 PATCH: 21 PATCH, EXTENDED RELEASE TRANSDERMAL at 08:18

## 2023-07-30 RX ADMIN — PANTOPRAZOLE SODIUM 40 MG: 40 TABLET, DELAYED RELEASE ORAL at 08:20

## 2023-07-30 RX ADMIN — AMLODIPINE BESYLATE 5 MG: 5 TABLET ORAL at 08:20

## 2023-07-30 RX ADMIN — ACETAMINOPHEN 975 MG: 325 TABLET, FILM COATED ORAL at 00:30

## 2023-07-30 RX ADMIN — METOPROLOL SUCCINATE 50 MG: 50 TABLET, EXTENDED RELEASE ORAL at 08:20

## 2023-07-30 RX ADMIN — ACETAMINOPHEN 975 MG: 325 TABLET, FILM COATED ORAL at 16:08

## 2023-07-30 RX ADMIN — ACETAMINOPHEN 975 MG: 325 TABLET, FILM COATED ORAL at 08:21

## 2023-07-30 RX ADMIN — SERTRALINE HYDROCHLORIDE 50 MG: 50 TABLET ORAL at 08:21

## 2023-07-30 RX ADMIN — ATORVASTATIN CALCIUM 40 MG: 40 TABLET, FILM COATED ORAL at 08:20

## 2023-07-30 RX ADMIN — TRAZODONE HYDROCHLORIDE 50 MG: 50 TABLET ORAL at 21:17

## 2023-07-30 RX ADMIN — ASPIRIN 325 MG: 325 TABLET, COATED ORAL at 08:20

## 2023-07-30 ASSESSMENT — ACTIVITIES OF DAILY LIVING (ADL)
ADLS_ACUITY_SCORE: 38
ADLS_ACUITY_SCORE: 34
ADLS_ACUITY_SCORE: 38

## 2023-07-30 NOTE — PROGRESS NOTES
[facility]    Medicine Progress Note - Hospitalist Service    Date of Admission:  7/27/2023    Assessment & Plan   Joseph Miller is a 62 year old male admitted on 7/27/2023 with right hip pain found to have right femur fracture.  He isn't sure if fall was due to trip or non-mechanical issue.     Closed displaced fracture right femoral neck s/p mechanical fall at his group home(details unclear)       POD 2 - percutaneous pinning, right hip       Right knee pain s/p fall  No cardiac arrythmias noted on tele monitoring (24 hours) - ok to discharge and removed tele today    Doing well post-op.    Further post-op orders per surgical service including pain control, DVT prophylaxis and activity orders (appears on ASA 325mg daily currently for rec DVT prophylaxis)    Continuing to work with PT/OT - SW assisting with disposition     Personally reviewed right knee xray (7/27/23 ) - no fracture    2. CAD s/p CABG  Cardiomyopathy   Hypertension  Hyperlipidemia:  Summary:  Historically has had an improving EF.  He had under 20% in 2020, ECHO reviewed 3/2021 with EF 25-30% and more recently EF 30% with global hypokinesis on cardiac MRI in march 2021.  -  ECHO 7/28/23 with improved EF 50-55%    *PTA meds include amlodipine 5mg daily, ASA 81mg daily, lipitor 40mg, toprol 50mg daily and furosemide 20mg daily  - hold PTA ASA and lasix for now, consider resuming tomorrow  - resume PTA amlodipine, toprol with hold parameters  - resume statin     3. GERD  Dysphagia:  - had video swallow on 7/18 with normal results  - PPI continued     4. CKD Stage 3:  Recent baseline has been 1.3-1.4  - Cr on admit is 1.42 *1.41 (7/28)    Creat this am is improved to 1.35       5. Hx CVA  Dementia hx:  Lives in group home, will need to see how mobility is going after surgery.  He may need TCU/rehab.  - continue PTA zoloft and trazodone     6. Tobacco use disorder:  -  Cessation to be encouraged.  Nicotine patch ordered and in  "place     Pt appears medically ready for discharge from internal medicine standpoint if ok with ortho surgery    Medical Decision Making       44 MINUTES SPENT BY ME on the date of service doing chart review, history, exam, documentation & further activities per the note.        PPE Worn:  Mask, gloves     Diet: Advance Diet as Tolerated: Regular Diet Adult  Room Service    DVT Prophylaxis: defer to surgical service - currently ASA 325mg/day ordered  Sharpe Catheter: Not present  Lines: None     Cardiac Monitoring: None  Code Status: Full Code      Clinically Significant Risk Factors                         # Overweight: Estimated body mass index is 27.34 kg/m  as calculated from the following:    Height as of this encounter: 1.765 m (5' 9.5\").    Weight as of this encounter: 85.2 kg (187 lb 13.3 oz)., PRESENT ON ADMISSION          Disposition Plan  ? TCU    Expected Discharge Date: 07/30/2023      Destination: group home            Berna Maldonado, DO  Hospitalist Service  [facility]  Securely message with [employer] (more info)  Text page via [employer] Paging/Directory   ______________________________________________________________________    Interval History     \"My leg and knee still hurt\"  \"They checked an xray of my knee so it can't be broke\"    Just finished pancakes for breakfast.  No HA, CP, SOB, F/C or N/V.  No new concerns noted per nursing this am.     Physical Exam   Vital Signs: Temp: 98.4  F (36.9  C) Temp src: Oral BP: 124/87 Pulse: 77   Resp: 14 SpO2: 95 % O2 Device: None (Room air)    Weight: 187 lbs 13.31 oz    GEN:  Alert, awake, no overt respiratory distress.  Appears to be in no acute distress - watching TV  HEENT:  Normocephalic/atraumatic, no scleral icterus, no nasal discharge, mouth moist.  CV:  Regular rate and rhythm, no clear loud murmur or JVD.  S1 + S2 noted, no S3 or S4.  LUNGS:  Clear to auscultation ant/lat bilaterally without clear " rales/rhonchi/wheezing/retractions. Symmetric chest rise on inhalation noted.  ABD:  Active bowel sounds, soft, non-tender/non-distended.  No rebound/guarding/rigidity.  EXT:  Rt knee observed without any significant erythema, swelling or bruising - mildly tender to palpation of the patella.  No significant palpation to the post patella area  Dry gauze dressing over the right lateral hip - no clear visualized bruising or fluctuance noted to the post-op area.  SKIN:  Dry to touch, no exanthems noted in the visualized areas.  PSYCH:  calm, cooperative    Medications    lactated ringers 100 mL/hr at 07/28/23 1447      acetaminophen  975 mg Oral Q8H    amLODIPine  5 mg Oral Daily    aspirin  325 mg Oral Daily    atorvastatin  40 mg Oral Daily    metoprolol succinate ER  50 mg Oral Daily    nicotine  1 patch Transdermal Daily    nicotine   Transdermal Q8H    pantoprazole  40 mg Oral Daily    polyethylene glycol  17 g Oral Daily    senna-docusate  1 tablet Oral BID    Or    senna-docusate  2 tablet Oral BID    sertraline  100 mg Oral Daily    sertraline  50 mg Oral Daily    sodium chloride (PF)  10 mL Intravenous Once    sodium chloride (PF)  3 mL Intracatheter Q8H    thiamine  100 mg Oral Daily    traZODone  50 mg Oral At Bedtime       Data     Labs and Imaging results below reviewed today.  Recent Labs   Lab 07/29/23  0707 07/28/23  0809 07/27/23 2014   WBC  --  6.3 7.1   HGB 13.0* 13.9 13.8   HCT  --  39.7* 40.0   MCV  --  93 93   PLT  --  192 198     Recent Labs   Lab 07/29/23  0707 07/28/23  0809 07/27/23 2014   NA  --  137 138   POTASSIUM  --  4.0 3.9   CHLORIDE  --  103 102   CO2  --  22 23   ANIONGAP  --  12 13   * 117* 128*   BUN  --  19.6 20.0   CR  --  1.41* 1.42*   GFRESTIMATED  --  57* 57*   ANTONELLA  --  9.6 9.4     7-Day Micro Results       No results found for the last 168 hours.            No results found for this or any previous visit (from the past 24 hour(s)).

## 2023-07-30 NOTE — PLAN OF CARE
Goal Outcome Evaluation:    Date/Time: 7/30/23 [id number]   Summary:  Closed displaced fracture of right femoral neck  Mechanical Fall  Mental Status: A&Ox3-4 (occasionally disoriented to time)  Activity/dangle:up to bathroom and chair using sera steady (assist of 1)  Diet: regular  Pain: Scheduled Tylenol and PRN Oxycodone  Sharpe/Voiding: incont.  Tele/Restraints/Iso: NA  Skin: NA  02/LDA: VSS on RA,PIV SL  D/C Date: Discharge pending placement

## 2023-07-30 NOTE — PROGRESS NOTES
Care Management Follow Up    Length of Stay (days): 3    Expected Discharge Date: 07/31/2023     Concerns to be Addressed:       Patient plan of care discussed at interdisciplinary rounds: Yes    Anticipated Discharge Disposition: Transitional Care     Anticipated Discharge Services: Transportation Services  Anticipated Discharge DME: None    Patient/family educated on Medicare website which has current facility and service quality ratings: no  Education Provided on the Discharge Plan:    Patient/Family in Agreement with the Plan: yes    Referrals Placed by CM/SW: Post Acute Facilities  Private pay costs discussed: Not applicable    Additional Information:  Call received from Gilbert with [facility] group home. He is asking for an update on patient discharge plan. Writer read him notes from PT and explained that writer met with patient yesterday and he was open to TCU. Writer also clarified that Rosio, who patient told writer was at group home, is in fact his friend and not a contact for his home. Gilbert will discuss with his RN to see if they think patient could return or if they agree in TCU. Writer looked at DOD and stated that patient has been accepted by [facility] in [place] and according to chart is medically ready. Gilbert asked for some information to be emailed to him at: [email or web address]. Writer printed off PT assessment and most recent hospitalist note and emailed per request.     ELMER Maddox

## 2023-07-30 NOTE — PROGRESS NOTES
Orthopedic Surgery  Joseph Miller  07/30/2023     Admit Date:  7/27/2023    POD: 2 Days Post-Op   Procedure(s):  CLOSED REDUCTION, RIGHT HIP, WITH PERCUTANEOUS PINNING     Patient resting in bed.   Pain controlled.  Tolerating oral intake.    Denies nausea or vomiting  Denies chest pain or shortness of breath  No acute events overnight    Temp:  [97.7  F (36.5  C)-98.4  F (36.9  C)] 97.7  F (36.5  C)  Pulse:  [67-77] 67  Resp:  [14-18] 14  BP: (124-128)/(80-87) 125/86  SpO2:  [93 %-95 %] 94 %    Alert and oriented  Dressing is clean, dry, and intact.   Minimal erythema of the surrounding skin.   Bilateral calves are soft, non-tender.  Right lower extremity is NVI.  Sensation intact bilateral lower extremities  Passive dorsi and plantar flexion bilaterally  +Dp pulse    Labs:  Recent Labs   Lab Test 07/30/23  0700 07/29/23  0707 07/28/23  0809 07/27/23 2014   WBC  --   --  6.3 7.1   HGB 12.5* 13.0* 13.9 13.8   PLT  --   --  192 198       Recent Labs   Lab Test 07/27/23 2014   INR 0.98       No lab results found.      1. PLAN:    mg for DVT prophylaxis.     Mobilize with PT/OT    WBAT   Continue current pain regimen   Dressings: Keep intact.  Change if >60% saturated or peeling off.    Follow-up: 2 weeks post-op with Dr. Garcia    2. Disposition   Anticipate d/c to TCU when medically cleared and progressing in PT.    Nithya Corona PA-C

## 2023-07-30 NOTE — PLAN OF CARE
Pt A/Ox4, VSS on RA, denies pain, CMS intact, assist of 2, POD 2, dressing CDI, will continue to monitor

## 2023-07-31 VITALS
HEIGHT: 70 IN | DIASTOLIC BLOOD PRESSURE: 70 MMHG | WEIGHT: 187.83 LBS | HEART RATE: 75 BPM | RESPIRATION RATE: 17 BRPM | OXYGEN SATURATION: 96 % | BODY MASS INDEX: 26.89 KG/M2 | SYSTOLIC BLOOD PRESSURE: 109 MMHG | TEMPERATURE: 98.2 F

## 2023-07-31 LAB — GLUCOSE BLDC GLUCOMTR-MCNC: 116 MG/DL (ref 70–99)

## 2023-07-31 PROCEDURE — 250N000013 HC RX MED GY IP 250 OP 250 PS 637: Performed by: PHYSICIAN ASSISTANT

## 2023-07-31 PROCEDURE — 250N000013 HC RX MED GY IP 250 OP 250 PS 637: Performed by: HOSPITALIST

## 2023-07-31 PROCEDURE — 250N000013 HC RX MED GY IP 250 OP 250 PS 637: Performed by: INTERNAL MEDICINE

## 2023-07-31 PROCEDURE — 99239 HOSP IP/OBS DSCHRG MGMT >30: CPT | Performed by: STUDENT IN AN ORGANIZED HEALTH CARE EDUCATION/TRAINING PROGRAM

## 2023-07-31 RX ADMIN — ASPIRIN 325 MG: 325 TABLET, COATED ORAL at 08:30

## 2023-07-31 RX ADMIN — AMLODIPINE BESYLATE 5 MG: 5 TABLET ORAL at 08:29

## 2023-07-31 RX ADMIN — ATORVASTATIN CALCIUM 40 MG: 40 TABLET, FILM COATED ORAL at 08:30

## 2023-07-31 RX ADMIN — OXYCODONE HYDROCHLORIDE 5 MG: 5 TABLET ORAL at 08:29

## 2023-07-31 RX ADMIN — NICOTINE 1 PATCH: 21 PATCH, EXTENDED RELEASE TRANSDERMAL at 08:31

## 2023-07-31 RX ADMIN — THIAMINE HCL TAB 100 MG 100 MG: 100 TAB at 08:30

## 2023-07-31 RX ADMIN — METOPROLOL SUCCINATE 50 MG: 50 TABLET, EXTENDED RELEASE ORAL at 08:30

## 2023-07-31 RX ADMIN — PANTOPRAZOLE SODIUM 40 MG: 40 TABLET, DELAYED RELEASE ORAL at 08:29

## 2023-07-31 RX ADMIN — SERTRALINE HYDROCHLORIDE 100 MG: 100 TABLET ORAL at 08:30

## 2023-07-31 RX ADMIN — SERTRALINE HYDROCHLORIDE 50 MG: 50 TABLET ORAL at 08:30

## 2023-07-31 RX ADMIN — ACETAMINOPHEN 975 MG: 325 TABLET, FILM COATED ORAL at 08:31

## 2023-07-31 ASSESSMENT — ACTIVITIES OF DAILY LIVING (ADL)
ADLS_ACUITY_SCORE: 38

## 2023-07-31 NOTE — PLAN OF CARE
A&OX2 to 3.  Up with assist of 2 and misty mclean.  Up in the chair for meals.  Hip dressing changed today.  Incontinent of urine.  Oxycodone given X1 this shift.  Patient discharging to TCU today between 2:30 and 3.:30 pm.

## 2023-07-31 NOTE — PLAN OF CARE
Physical Therapy Discharge Summary    Reason for therapy discharge:    Discharged to transitional care facility.    Progress towards therapy goal(s). See goals on Care Plan in [facility] electronic health record for goal details.  Goals not met.  Barriers to achieving goals:   discharge from facility.    Therapy recommendation(s):    Continued therapy is recommended.  Rationale/Recommendations:  To progress independence and safety with functional mobility.

## 2023-07-31 NOTE — PLAN OF CARE
Pt A/Ox4, VSS on RA, denies pain, CMS intact, assist of 2, POD 3, dressing CDI, will continue to monitor

## 2023-07-31 NOTE — PROGRESS NOTES
Care Management Discharge Note    Discharge Date: 07/31/2023       Discharge Disposition: Transitional Care    Discharge Services: Transportation Services    Discharge DME: None    Discharge Transportation:      Private pay costs discussed: transportation costs    Does the patient's insurance plan have a 3 day qualifying hospital stay waiver?  No    PAS Confirmation Code: [id number]  Patient/family educated on Medicare website which has current facility and service quality ratings: no    Education Provided on the Discharge Plan:    Persons Notified of Discharge Plans: yes  Patient/Family in Agreement with the Plan: yes    Handoff Referral Completed: No    Additional Information:  Call placed to [facility] at [phone]. He said that he spoke to his director and they would like patient to go to TCU for a short stay before returning to group [facility]. Writer provided contact information for [facility] and reported that a ride will be set up. Call to [facility] transport and wheelchair scheduled today between [phone]. [facility] Orders for [facility] requested. Gilbert updated with discharge time and in agreement. Patient also updated on plan and in agreement.    PAS completed.     PAS-RR    D: Per DHS regulation, SW completed and submitted PAS-RR to [place] [employer] Direct Connect via the Senior LinkAge Line.  PAS-RR confirmation # is : [id number]    I: SW spoke with [facility] and they are aware a PAS-RR has been submitted.  SW reviewed with [facility] that they may be contacted for a follow up appointment within 10 days of hospital discharge if their SNF stay is < 30 days.  Contact information for [facility] Line was also provided.    A: group home verbalized understanding.    P: Further questions may be directed to [facility] LinkAge Line at #[phone], option #4 for PAS-RR staff.      LEMER Maddox

## 2023-07-31 NOTE — DISCHARGE SUMMARY
"[facility]  Hospitalist Discharge Summary      Date of Admission:  7/27/2023  Date of Discharge:  7/31/2023  Discharging Provider: Fran Brooks MD  Discharge Service: Hospitalist Service    Discharge Diagnoses     Closed displaced fracture right femoral neck s/p mechanical fall at his group home     Clinically Significant Risk Factors     # Overweight: Estimated body mass index is 27.34 kg/m  as calculated from the following:    Height as of this encounter: 1.765 m (5' 9.5\").    Weight as of this encounter: 85.2 kg (187 lb 13.3 oz).       Follow-ups Needed After Discharge   Follow-up Appointments     Follow Up and recommended labs and tests      Follow-up with Dr. Garcia ([facility]) at 2 weeks postop   for reevaluation and wound check if applicable. No need for follow up labs   or testing prior to this appointment.     Please contact Dr. Garcia's office at [phone] to schedule.    [facility] [place] After Hours Phone: [phone]  [facility] [place] After Hours Phone: [phone]            Unresulted Labs Ordered in the Past 30 Days of this Admission       No orders found from 6/27/2023 to 7/28/2023.          Discharge Disposition   Discharged to rehabilitation facility  Condition at discharge: Stable    Hospital Course        Joseph Miller is a 62 year old male admitted on 7/27/2023 with right hip pain found to have right femur fracture.  He isn't sure if fall was due to trip or non-mechanical issue.     Closed displaced fracture right femoral neck s/p mechanical fall at his group home(details unclear)        POD 2 - percutaneous pinning, right hip         Right knee pain s/p fall      2. CAD s/p CABG  Cardiomyopathy   Hypertension  Hyperlipidemia:  Summary:  Historically has had an improving EF.  He had under 20% in 2020, ECHO reviewed 3/2021 with EF 25-30% and more recently EF 30% with global hypokinesis on cardiac MRI in march 2021.  -  ECHO " 7/28/23 with improved EF 50-55%     *PTA meds include amlodipine 5mg daily, ASA 81mg daily, lipitor 40mg, toprol 50mg daily and furosemide 20mg daily  - PTA Lasix resumed  - resume PTA amlodipine, toprol  - resume statin  -   mg for DVT prophylaxis.        3. GERD  Dysphagia:  - had video swallow on 7/18 with normal results  - PPI continued     4. CKD Stage 3:  Recent baseline has been 1.3-1.4  - Cr on admit is 1.42 *1.41 (7/28)     Creat this am is improved to 1.35        5. Hx CVA  Dementia hx:  Lives in group home, will need to see how mobility is going after surgery.  He may need TCU/rehab.  - continue PTA zoloft and trazodone     6. Tobacco use disorder:  -  Cessation to be encouraged.  Nicotine patch ordered and in place       Consultations This Hospital Stay   ORTHOPEDIC SURGERY IP CONSULT  CARE MANAGEMENT / SOCIAL WORK IP CONSULT  PHYSICAL THERAPY ADULT IP CONSULT  OCCUPATIONAL THERAPY ADULT IP CONSULT  OCCUPATIONAL THERAPY ADULT IP CONSULT  PHYSICAL THERAPY ADULT IP CONSULT    Code Status   Full Code    Time Spent on this Encounter   I, Fran Brooks MD, personally saw the patient today and spent greater than 30 minutes discharging this patient.       Fran Brooks MD  [facility]  [address] [place] [id number]  Phone: [phone]  Fax: [phone]  ______________________________________________________________________    Physical Exam   Vital Signs: Temp: 98.2  F (36.8  C) Temp src: Oral BP: 109/70 Pulse: 75   Resp: 17 SpO2: 96 % O2 Device: None (Room air)    Weight: 187 lbs 13.31 oz  ----------------------------------------------------------------------------------------       Primary Care Physician   Physician No Ref-Primary    Discharge Orders      General info for SNF    Length of Stay Estimate: Short Term Care: Estimated # of Days <30  Condition at Discharge: Stable  Level of care:skilled   Rehabilitation Potential: Good  Admission H&P remains valid and  up-to-date: Yes  Recent Chemotherapy: N/A  Use Nursing Home Standing Orders: Yes     Mantoux instructions    Give two-step Mantoux (PPD) Per Facility Policy Yes     Wound care (specify)    Site:   right hip  Instructions:  Do not submerge in water. Cover for showers. Change if >60% saturation.     Follow Up and recommended labs and tests    Follow-up with Dr. Garcia ([facility]) at 2 weeks postop for reevaluation and wound check if applicable. No need for follow up labs or testing prior to this appointment.     Please contact Dr. Garcia's office at [phone] to schedule.    [facility] [place] After Hours Phone: [phone]  [facility] [place] After Hours Phone: [phone]     Activity - Up with assistive device     Weight bearing status    Weight bearing as tolerated with assistive device     Reason for your hospital stay    impacted femoral neck fracture, right     Intake and output    Every shift     Daily weights    Call Provider for weight gain of more than 2 pounds per day or 5 pounds per week.     Activity - Up with assistive device     Full Code     Occupational Therapy Adult Consult    Evaluate and treat as clinically indicated.    Reason:  impacted femoral neck fracture, right     Physical Therapy Adult Consult    Evaluate and treat as clinically indicated.    Reason:  impacted femoral neck fracture, right     Fall precautions     Fall precautions     Crutches DME    DME Documentation: Describe the reason for need to support medical necessity: Impaired gait status post hip surgery. I, the undersigned, certify that the above prescribed supplies are medically necessary for this patient and is both reasonable and necessary in reference to accepted standards of medical practice in the treatment of this patient's condition and is not prescribed as a convenience.     [facility] DME    DME Documentation: Describe the reason for need to support medical necessity: Impaired  gait status post hip surgery. I, the undersigned, certify that the above prescribed supplies are medically necessary for this patient and is both reasonable and necessary in reference to accepted standards of medical practice in the treatment of this patient's condition and is not prescribed as a convenience.     [facility]    : DME Documentation: Describe the reason for need to support medical necessity: Impaired gait status post hip surgery. I, the undersigned, certify that the above prescribed supplies are medically necessary for this patient and is both reasonable and necessary in reference to accepted standards of medical practice in the treatment of this patient's condition and is not prescribed as a convenience.     Diet    Follow this diet upon discharge: Orders Placed This Encounter      Room Service      Advance Diet as Tolerated: Regular Diet Adult       Significant Results and Procedures   Most Recent 3 CBC's:  Recent Labs   Lab Test 07/30/23  0700 07/29/23  0707 07/28/23  0809 07/27/23 2014   WBC  --   --  6.3 7.1   HGB 12.5* 13.0* 13.9 13.8   MCV  --   --  93 93   PLT  --   --  192 198     Most Recent 3 BMP's:  Recent Labs   Lab Test 07/31/23  0152 07/30/23 0700 07/29/23  0707 07/28/23  0809 07/27/23 2014   NA  --  139  --  137 138   POTASSIUM  --  3.7  --  4.0 3.9   CHLORIDE  --  104  --  103 102   CO2  --  26  --  22 23   BUN  --  28.3*  --  19.6 20.0   CR  --  1.35*  --  1.41* 1.42*   ANIONGAP  --  9  --  12 13   ANTONELLA  --  9.5  --  9.6 9.4   * 122* 137* 117* 128*     Most Recent 2 LFT's:No lab results found.  Most Recent 3 INR's:  Recent Labs   Lab Test 07/27/23 2014   INR 0.98     Most Recent 3 Troponin's:No lab results found.  Most Recent 3 BNP's:  Recent Labs   Lab Test 07/27/23 2014   [id number]     Most Recent Cholesterol Panel:No lab results found.  Most Recent Urinalysis:No lab results found.,   Results for orders placed or performed during the hospital encounter of 07/27/23   XR  Knee Right 3 Views    Narrative    EXAM: XR KNEE RIGHT 3 VIEWS  LOCATION: [facility]  DATE: 7/27/2023    INDICATION: righ knee discomfort after a fall  COMPARISON: None.      Impression    IMPRESSION: No acute fracture or malalignment. No significant degenerative changes or knee joint effusion. Osteopenia.   XR Pelvis w Hip Right 1 View    Narrative    EXAM: XR PELVIS AND HIP RIGHT 1 VIEW  LOCATION: [facility]  DATE: 7/27/2023    INDICATION: right hip pain after a fall, hard time weight bearing walking  COMPARISON: None.      Impression    IMPRESSION: There is an acute mildly displaced and impacted right femoral neck fracture in valgus alignment. Mild degenerative changes of the right hip.     NOTE: ABNORMAL REPORT    THE DICTATION ABOVE DESCRIBES AN ABNORMALITY FOR WHICH FOLLOW-UP IS NEEDED.    XR Chest 1 View    Narrative    EXAM: XR CHEST 1 VIEW  LOCATION: [facility]  DATE: 7/27/2023    INDICATION: pre op  COMPARISON: None available      Impression    IMPRESSION: Normal size of cardiomediastinal silhouette status post median sternotomy and CABG. No focal airspace consolidation, pleural effusion or pneumothorax. No acute bony abnormality.   XR Surgery DESTINY L/T 5 Min Fluoro w Stills    Narrative    XR SURGERY DESTINY FLUORO LESS THAN 5 MIN W STILLS 7/28/2023 1:07 PM     HISTORY: ORIF right hip, fluoro time 50 seconds, 7 images,carm3.    NUMBER OF IMAGES ACQUIRED: 7    FLUOROSCOPY TIME: 0.8 minute(s)      Impression    IMPRESSION: C-arm used for an orthopedic procedure. Images obtained  intraoperatively show multiple cannulated screw fixation across a  mildly displaced subcapital fracture of the femur.    FREDO PARRISH MD         SYSTEM ID:  [id number]   Echocardiogram Complete     Value    LVEF  50-55%    Narrative    [id number]  [id number]  [id number]  [id number]^ENEIDA^INDIA^P     [facility]  Echocardiography Laboratory  [address]  [place] [address]     Name: LUIS M HILL  MRN: [id number]  : 1960  Study Date: 2023 10:17 AM  Age: 62 yrs  Gender: Male  Patient Location: [facility]  Reason For Study: CHF  Ordering Physician: INDIA MONIQUE  Referring Physician: Radha Ref-Primary, Physician  Performed By: Erlinda Delgado RDCS     BSA: 2.0 m2  Height: 69 in  Weight: 186 lb  HR: 80  BP: 127/74 mmHg  ______________________________________________________________________________  Procedure  Complete Portable Echo Adult. Optison (NDC #[id number]) given intravenously.  ______________________________________________________________________________  Interpretation Summary     The left ventricle is normal in size.  The visual ejection fraction is 50-55%.  Diastolic Doppler findings (E/E' ratio and/or other parameters) suggest left  ventricular filling pressures are normal.  There is mild to moderate apical wall hypokinesis.  No significant valvular heart disease.  ______________________________________________________________________________  Left Ventricle  The left ventricle is normal in size. There is normal left ventricular wall  thickness. Diastolic Doppler findings (E/E' ratio and/or other parameters)  suggest left ventricular filling pressures are normal. The visual ejection  fraction is 50-55%. There is mild to moderate apical wall hypokinesis.     Right Ventricle  The right ventricle is normal in size and function.     Atria  Normal left atrial size. Right atrial size is normal. There is no color  Doppler evidence of an atrial shunt.     Mitral Valve  The mitral valve leaflets are mildly thickened. There is trace mitral  regurgitation.     Tricuspid Valve  There is trace tricuspid regurgitation.     Aortic Valve  There is trivial trileaflet aortic sclerosis. No aortic regurgitation is  present.     Pulmonic Valve  There is trace pulmonic valvular regurgitation.     Vessels  Normal size aorta. Borderline aortic root  dilatation.     Pericardium  There is no pericardial effusion.     Rhythm  Sinus rhythm was noted.  ______________________________________________________________________________  MMode/2D Measurements & Calculations  IVSd: 1.2 cm     LVIDd: 4.2 cm  LVIDs: 2.9 cm  LVPWd: 0.87 cm  FS: 32.4 %  LV mass(C)d: 143.6 grams  LV mass(C)dI: 71.7 grams/m2  Ao root diam: 3.8 cm  LA dimension: 3.0 cm  asc Aorta Diam: 3.1 cm  LA/Ao: 0.79  LA Volume (BP): 30.8 ml  LA Volume Index (BP): 15.4 ml/m2  RWT: 0.41  TAPSE: 1.1 cm     Doppler Measurements & Calculations  MV E max blane: 43.3 cm/sec  MV A max blane: 64.3 cm/sec  MV E/A: 0.67  MV dec time: 0.19 sec  PA acc time: 0.10 sec  E/E' av.4  Lateral E/e': 5.9  Medial E/e': 6.9  RV S Blane: 9.8 cm/sec     ______________________________________________________________________________  Report approved by: Gagandeep Hope 2023 11:06 AM             Discharge Medications   Current Discharge Medication List        START taking these medications    Details   aspirin (ASA) 325 MG EC tablet Take 1 tablet (325 mg) by mouth daily  Qty: 42 tablet, Refills: 0    Associated Diagnoses: Closed displaced fracture of right femoral neck (H)      oxyCODONE (ROXICODONE) 5 MG tablet Take 1 tablet (5 mg) by mouth every 4 hours as needed for severe pain (IF pain not managed with non-pharmacological and non-opioid interventions)  Qty: 10 tablet, Refills: 0    Associated Diagnoses: Closed displaced fracture of right femoral neck (H)      senna-docusate (SENOKOT-S/PERICOLACE) 8.6-50 MG tablet Take 1 tablet by mouth 2 times daily as needed for constipation  Qty: 30 tablet, Refills: 0    Associated Diagnoses: Closed displaced fracture of right femoral neck (H)           CONTINUE these medications which have CHANGED    Details   acetaminophen (TYLENOL) 325 MG tablet Take 3 tablets (975 mg) by mouth every 8 hours  Qty: 100 tablet, Refills: 0    Associated Diagnoses: Closed displaced fracture of right femoral  neck (H)           CONTINUE these medications which have NOT CHANGED    Details   amLODIPine (NORVASC) 5 MG tablet Take 5 mg by mouth daily      atorvastatin (LIPITOR) 40 MG tablet Take 40 mg by mouth At Bedtime      furosemide (LASIX) 20 MG tablet Take 20 mg by mouth daily      metoprolol succinate ER (TOPROL XL) 50 MG 24 hr tablet Take 50 mg by mouth daily      omeprazole (PRILOSEC) 20 MG DR capsule Take 20 mg by mouth daily      potassium chloride ER (KLOR-CON M) 20 MEQ CR tablet Take 40 mEq by mouth daily      !! sertraline (ZOLOFT) 100 MG tablet Take 100 mg by mouth daily Take with 50mg tablet for total dose of 150mg daily      !! sertraline (ZOLOFT) 50 MG tablet Take 50 mg by mouth daily Take with 100mg tablet for total dose of 150mg daily      thiamine (B-1) 100 MG tablet Take 100 mg by mouth daily      traZODone (DESYREL) 50 MG tablet Take 50 mg by mouth At Bedtime       !! - Potential duplicate medications found. Please discuss with provider.        STOP taking these medications       aspirin (ASA) 81 MG chewable tablet Comments:   Reason for Stopping:             Allergies   No Known Allergies

## 2023-07-31 NOTE — PROGRESS NOTES
Orthopedic Surgery  Joseph Miller  07/31/2023     Admit Date:  7/27/2023    POD: 3 Days Post-Op   Procedure(s):  CLOSED REDUCTION, RIGHT HIP, WITH PERCUTANEOUS PINNING     Patient sitting comfortably in chair. Not very talkative.  Pain controlled.  Denies numbness and tingling.  Tolerating oral intake.    Denies nausea or vomiting.  Denies chest pain or shortness of breath.  Patient curious as to when he will be discharging.    Temp:  [98.1  F (36.7  C)-98.6  F (37  C)] 98.1  F (36.7  C)  Pulse:  [81-88] 85  Resp:  [16-18] 16  BP: (106-132)/(77-92) 126/92  SpO2:  [96 %-97 %] 96 %    Alert and oriented.  Right hip dressing is clean, dry, and intact.   Minimal erythema of the surrounding skin.   Bilateral calves are soft, non-tender.  Right lower extremity is NVI.  Sensation intact bilateral lower extremities.  Patient able to actively DF and PF the ankles but limited due to poor effort. No pain with passive ankle DF and PF.  +DP pulse.    Labs:  Recent Labs   Lab Test 07/30/23  0700 07/29/23  0707 07/28/23  0809 07/27/23 2014   WBC  --   --  6.3 7.1   HGB 12.5* 13.0* 13.9 13.8   PLT  --   --  192 198       Recent Labs   Lab Test 07/27/23 2014   INR 0.98       1. Plan:    mg for DVT prophylaxis.     Mobilize with PT/OT.    WBAT RLE with walker.   Continue current pain regimen.   Dressings: Keep intact. Change if >60% saturated or peeling off.    Follow-up: 2 weeks post-op with Dr. Garcia    2. Disposition   Anticipate d/c to TCU when medically cleared and progressing in PT.    Geovanna Tripp PA-C  [facility]

## 2023-08-04 ENCOUNTER — LAB REQUISITION (OUTPATIENT)
Dept: LAB | Facility: CLINIC | Age: 63
End: 2023-08-04
Payer: COMMERCIAL

## 2023-08-04 DIAGNOSIS — I10 ESSENTIAL (PRIMARY) HYPERTENSION: ICD-10-CM

## 2023-08-04 DIAGNOSIS — Z11.1 ENCOUNTER FOR SCREENING FOR RESPIRATORY TUBERCULOSIS: ICD-10-CM

## 2023-08-07 ENCOUNTER — DOCUMENTATION ONLY (OUTPATIENT)
Dept: OTHER | Facility: CLINIC | Age: 63
End: 2023-08-07

## 2023-08-07 LAB
ANION GAP SERPL CALCULATED.3IONS-SCNC: 13 MMOL/L (ref 7–15)
BASOPHILS # BLD AUTO: 0 10E3/UL (ref 0–0.2)
BASOPHILS NFR BLD AUTO: 1 %
BUN SERPL-MCNC: 23.3 MG/DL (ref 8–23)
CALCIUM SERPL-MCNC: 9.3 MG/DL (ref 8.8–10.2)
CHLORIDE SERPL-SCNC: 102 MMOL/L (ref 98–107)
CREAT SERPL-MCNC: 1.29 MG/DL (ref 0.67–1.17)
DEPRECATED HCO3 PLAS-SCNC: 25 MMOL/L (ref 22–29)
EOSINOPHIL # BLD AUTO: 0.2 10E3/UL (ref 0–0.7)
EOSINOPHIL NFR BLD AUTO: 3 %
ERYTHROCYTE [DISTWIDTH] IN BLOOD BY AUTOMATED COUNT: 12.7 % (ref 10–15)
GFR SERPL CREATININE-BSD FRML MDRD: 63 ML/MIN/1.73M2
GLUCOSE SERPL-MCNC: 88 MG/DL (ref 70–99)
HCT VFR BLD AUTO: 39 % (ref 40–53)
HGB BLD-MCNC: 13.1 G/DL (ref 13.3–17.7)
IMM GRANULOCYTES # BLD: 0 10E3/UL
IMM GRANULOCYTES NFR BLD: 1 %
LYMPHOCYTES # BLD AUTO: 1.5 10E3/UL (ref 0.8–5.3)
LYMPHOCYTES NFR BLD AUTO: 25 %
MCH RBC QN AUTO: 31.4 PG (ref 26.5–33)
MCHC RBC AUTO-ENTMCNC: 33.6 G/DL (ref 31.5–36.5)
MCV RBC AUTO: 94 FL (ref 78–100)
MONOCYTES # BLD AUTO: 0.7 10E3/UL (ref 0–1.3)
MONOCYTES NFR BLD AUTO: 11 %
NEUTROPHILS # BLD AUTO: 3.6 10E3/UL (ref 1.6–8.3)
NEUTROPHILS NFR BLD AUTO: 59 %
NRBC # BLD AUTO: 0 10E3/UL
NRBC BLD AUTO-RTO: 0 /100
PLATELET # BLD AUTO: 259 10E3/UL (ref 150–450)
POTASSIUM SERPL-SCNC: 3.6 MMOL/L (ref 3.4–5.3)
RBC # BLD AUTO: 4.17 10E6/UL (ref 4.4–5.9)
SODIUM SERPL-SCNC: 140 MMOL/L (ref 136–145)
WBC # BLD AUTO: 6 10E3/UL (ref 4–11)

## 2023-08-07 PROCEDURE — 80048 BASIC METABOLIC PNL TOTAL CA: CPT | Mod: ORL | Performed by: NURSE PRACTITIONER

## 2023-08-07 PROCEDURE — 86481 TB AG RESPONSE T-CELL SUSP: CPT | Mod: ORL | Performed by: NURSE PRACTITIONER

## 2023-08-07 PROCEDURE — 85025 COMPLETE CBC W/AUTO DIFF WBC: CPT | Mod: ORL | Performed by: NURSE PRACTITIONER

## 2023-08-07 PROCEDURE — 36415 COLL VENOUS BLD VENIPUNCTURE: CPT | Mod: ORL | Performed by: NURSE PRACTITIONER

## 2023-08-07 PROCEDURE — P9604 ONE-WAY ALLOW PRORATED TRIP: HCPCS | Mod: ORL | Performed by: NURSE PRACTITIONER

## 2023-08-08 LAB
QUANTIFERON MITOGEN: 10 IU/ML
QUANTIFERON NIL TUBE: 0.06 IU/ML
QUANTIFERON TB1 TUBE: 0.09 IU/ML
QUANTIFERON TB2 TUBE: 0.07

## 2023-08-09 LAB
GAMMA INTERFERON BACKGROUND BLD IA-ACNC: 0.06 IU/ML
M TB IFN-G BLD-IMP: NEGATIVE
M TB IFN-G CD4+ BCKGRND COR BLD-ACNC: 9.94 IU/ML
MITOGEN IGNF BCKGRD COR BLD-ACNC: 0.01 IU/ML
MITOGEN IGNF BCKGRD COR BLD-ACNC: 0.03 IU/ML

## (undated) DEVICE — STPL SKIN 35W ROTATING HEAD PRW35

## (undated) DEVICE — CAST PADDING 4" UNSTERILE 9044

## (undated) DEVICE — DRAPE C-ARM 60X42" 1013

## (undated) DEVICE — SU MONOCRYL 3-0 PS-2 27" Y427H

## (undated) DEVICE — DRILL BIT QUICK COUPLING CAN 5.0X300MM 310.63

## (undated) DEVICE — PACK HIP NAILING SOP15HNSB

## (undated) DEVICE — ESU GROUND PAD ADULT W/CORD E7507

## (undated) DEVICE — LINEN TOWEL PACK X5 5464

## (undated) DEVICE — SOL WATER IRRIG 1000ML BOTTLE 2F7114

## (undated) DEVICE — SU VICRYL 0 CT-1 27" J340H

## (undated) DEVICE — Device

## (undated) DEVICE — DRSG XEROFORM 5X9" 8884431605

## (undated) DEVICE — PREP CHLORAPREP 26ML TINTED HI-LITE ORANGE 930815

## (undated) DEVICE — BLADE CLIPPER 4406

## (undated) DEVICE — SOL NACL 0.9% IRRIG 1000ML BOTTLE 2F7124

## (undated) DEVICE — DECANTER BAG 2002S

## (undated) DEVICE — SPONGE LAP 18X18" X8435

## (undated) DEVICE — BLADE CLIPPER SGL USE 9680

## (undated) DEVICE — WIRE GUIDE 2.8X300MM THRD TROCAR POINT 292.68

## (undated) RX ORDER — DEXAMETHASONE SODIUM PHOSPHATE 4 MG/ML
INJECTION, SOLUTION INTRA-ARTICULAR; INTRALESIONAL; INTRAMUSCULAR; INTRAVENOUS; SOFT TISSUE
Status: DISPENSED
Start: 2023-07-28

## (undated) RX ORDER — FENTANYL CITRATE 50 UG/ML
INJECTION, SOLUTION INTRAMUSCULAR; INTRAVENOUS
Status: DISPENSED
Start: 2023-07-28

## (undated) RX ORDER — ONDANSETRON 2 MG/ML
INJECTION INTRAMUSCULAR; INTRAVENOUS
Status: DISPENSED
Start: 2023-07-28

## (undated) RX ORDER — CEFAZOLIN SODIUM/WATER 2 G/20 ML
SYRINGE (ML) INTRAVENOUS
Status: DISPENSED
Start: 2023-07-28